# Patient Record
Sex: FEMALE | Race: WHITE | NOT HISPANIC OR LATINO | Employment: OTHER | URBAN - METROPOLITAN AREA
[De-identification: names, ages, dates, MRNs, and addresses within clinical notes are randomized per-mention and may not be internally consistent; named-entity substitution may affect disease eponyms.]

---

## 2017-03-16 ENCOUNTER — LAB REQUISITION (OUTPATIENT)
Dept: LAB | Facility: HOSPITAL | Age: 52
End: 2017-03-16

## 2017-03-16 DIAGNOSIS — J32.9 CHRONIC SINUSITIS: ICD-10-CM

## 2017-03-16 LAB — POTASSIUM BLDA-SCNC: 3.54 MMOL/L (ref 3.5–5.3)

## 2017-03-16 PROCEDURE — 87077 CULTURE AEROBIC IDENTIFY: CPT | Performed by: OTOLARYNGOLOGY

## 2017-03-16 PROCEDURE — 87070 CULTURE OTHR SPECIMN AEROBIC: CPT | Performed by: OTOLARYNGOLOGY

## 2017-03-16 PROCEDURE — 88311 DECALCIFY TISSUE: CPT | Performed by: OTOLARYNGOLOGY

## 2017-03-16 PROCEDURE — 87147 CULTURE TYPE IMMUNOLOGIC: CPT | Performed by: OTOLARYNGOLOGY

## 2017-03-16 PROCEDURE — 84132 ASSAY OF SERUM POTASSIUM: CPT | Performed by: OTOLARYNGOLOGY

## 2017-03-16 PROCEDURE — 87015 SPECIMEN INFECT AGNT CONCNTJ: CPT | Performed by: OTOLARYNGOLOGY

## 2017-03-16 PROCEDURE — 87186 SC STD MICRODIL/AGAR DIL: CPT | Performed by: OTOLARYNGOLOGY

## 2017-03-16 PROCEDURE — 87102 FUNGUS ISOLATION CULTURE: CPT | Performed by: OTOLARYNGOLOGY

## 2017-03-16 PROCEDURE — 88305 TISSUE EXAM BY PATHOLOGIST: CPT | Performed by: OTOLARYNGOLOGY

## 2017-03-16 PROCEDURE — 87206 SMEAR FLUORESCENT/ACID STAI: CPT | Performed by: OTOLARYNGOLOGY

## 2017-03-16 PROCEDURE — 87205 SMEAR GRAM STAIN: CPT | Performed by: OTOLARYNGOLOGY

## 2017-03-17 LAB
CYTO UR: NORMAL
LAB AP CASE REPORT: NORMAL
LAB AP CLINICAL INFORMATION: NORMAL
Lab: NORMAL
PATH REPORT.FINAL DX SPEC: NORMAL
PATH REPORT.GROSS SPEC: NORMAL

## 2017-03-20 LAB
GIE STN SPEC: NORMAL
GIE STN SPEC: NORMAL

## 2017-03-22 LAB
BACTERIA SPEC AEROBE CULT: ABNORMAL
GRAM STN SPEC: ABNORMAL
GRAM STN SPEC: ABNORMAL

## 2017-03-23 LAB
BACTERIA FLD CULT: ABNORMAL
GRAM STN SPEC: ABNORMAL

## 2017-04-27 ENCOUNTER — TELEPHONE (OUTPATIENT)
Dept: OBSTETRICS AND GYNECOLOGY | Facility: CLINIC | Age: 52
End: 2017-04-27

## 2017-04-27 LAB
FUNGUS WND CULT: NORMAL
FUNGUS WND CULT: NORMAL

## 2017-04-27 NOTE — TELEPHONE ENCOUNTER
----- Message from Martha Bajwa sent at 4/27/2017  1:29 PM EDT -----  Contact: patient  Pt needs a refill on Premarin 0.625 until she can schedule her annual, she is changing her insurance and wont be in effect until June. She also requested to have a Diflucan called in because she has been on an antibiotic for a sinus infection and is having itching and discharge.

## 2017-04-28 RX ORDER — FLUCONAZOLE 150 MG/1
TABLET ORAL
Qty: 2 TABLET | Refills: 0 | Status: SHIPPED | OUTPATIENT
Start: 2017-04-28 | End: 2017-04-28 | Stop reason: SDUPTHER

## 2017-04-28 RX ORDER — FLUCONAZOLE 150 MG/1
TABLET ORAL
Qty: 2 TABLET | Refills: 0 | Status: SHIPPED | OUTPATIENT
Start: 2017-04-28 | End: 2017-08-23

## 2017-04-28 RX ORDER — FLUCONAZOLE 150 MG/1
TABLET ORAL
Qty: 2 TABLET | Refills: 0 | Status: SHIPPED | OUTPATIENT
Start: 2017-04-28 | End: 2017-06-16 | Stop reason: SDUPTHER

## 2017-07-18 NOTE — TELEPHONE ENCOUNTER
----- Message from Lynn Joyce sent at 7/18/2017  1:48 PM EDT -----  Contact: PT  PT HAS ANNUAL ON 8/23, NEEDS REFILL TO LAST UNTIL APPT.    [phone]  [facility]  [facility]

## 2017-07-20 ENCOUNTER — LAB REQUISITION (OUTPATIENT)
Dept: LAB | Facility: HOSPITAL | Age: 52
End: 2017-07-20

## 2017-07-20 DIAGNOSIS — J01.90 ACUTE SINUSITIS: ICD-10-CM

## 2017-07-20 PROCEDURE — 88305 TISSUE EXAM BY PATHOLOGIST: CPT | Performed by: OTOLARYNGOLOGY

## 2017-07-20 PROCEDURE — 87070 CULTURE OTHR SPECIMN AEROBIC: CPT | Performed by: OTOLARYNGOLOGY

## 2017-07-20 PROCEDURE — 87205 SMEAR GRAM STAIN: CPT | Performed by: OTOLARYNGOLOGY

## 2017-07-20 PROCEDURE — 88311 DECALCIFY TISSUE: CPT | Performed by: OTOLARYNGOLOGY

## 2017-07-20 PROCEDURE — 87102 FUNGUS ISOLATION CULTURE: CPT | Performed by: OTOLARYNGOLOGY

## 2017-07-20 PROCEDURE — 87206 SMEAR FLUORESCENT/ACID STAI: CPT | Performed by: OTOLARYNGOLOGY

## 2017-07-20 PROCEDURE — 87077 CULTURE AEROBIC IDENTIFY: CPT | Performed by: OTOLARYNGOLOGY

## 2017-07-20 PROCEDURE — 87186 SC STD MICRODIL/AGAR DIL: CPT | Performed by: OTOLARYNGOLOGY

## 2017-07-20 PROCEDURE — 87147 CULTURE TYPE IMMUNOLOGIC: CPT | Performed by: OTOLARYNGOLOGY

## 2017-07-23 LAB
BACTERIA SPEC AEROBE CULT: ABNORMAL
GRAM STN SPEC: ABNORMAL

## 2017-07-24 LAB
BACTERIA SPEC AEROBE CULT: ABNORMAL
GRAM STN SPEC: ABNORMAL

## 2017-07-25 LAB
GIE STN SPEC: NORMAL
GIE STN SPEC: NORMAL

## 2017-08-23 ENCOUNTER — OFFICE VISIT (OUTPATIENT)
Dept: OBSTETRICS AND GYNECOLOGY | Facility: CLINIC | Age: 52
End: 2017-08-23

## 2017-08-23 ENCOUNTER — HOSPITAL ENCOUNTER (OUTPATIENT)
Dept: MAMMOGRAPHY | Facility: HOSPITAL | Age: 52
Discharge: HOME | End: 2017-08-23
Admitting: PHYSICIAN ASSISTANT

## 2017-08-23 VITALS
HEIGHT: 65 IN | DIASTOLIC BLOOD PRESSURE: 82 MMHG | SYSTOLIC BLOOD PRESSURE: 144 MMHG | BODY MASS INDEX: 28.66 KG/M2 | WEIGHT: 172 LBS

## 2017-08-23 DIAGNOSIS — Z01.411 ENCOUNTER FOR GYNECOLOGICAL EXAMINATION WITH ABNORMAL FINDING: Primary | ICD-10-CM

## 2017-08-23 DIAGNOSIS — N95.1 MENOPAUSAL AND FEMALE CLIMACTERIC STATES: ICD-10-CM

## 2017-08-23 DIAGNOSIS — Z01.411 ENCOUNTER FOR GYNECOLOGICAL EXAMINATION WITH ABNORMAL FINDING: ICD-10-CM

## 2017-08-23 DIAGNOSIS — R35.0 URINARY FREQUENCY: ICD-10-CM

## 2017-08-23 DIAGNOSIS — B37.31 VAGINAL YEAST INFECTION: ICD-10-CM

## 2017-08-23 DIAGNOSIS — Z12.72 SPECIAL SCREENING FOR MALIGNANT NEOPLASM OF VAGINA: ICD-10-CM

## 2017-08-23 LAB
GLUCOSE UR STRIP-MCNC: NEGATIVE MG/DL
KETONES UR QL: NEGATIVE
LEUKOCYTE EST, POC: ABNORMAL
NITRITE UR-MCNC: NEGATIVE MG/ML
PROT UR STRIP-MCNC: NEGATIVE MG/DL
RBC # UR STRIP: ABNORMAL /UL

## 2017-08-23 PROCEDURE — 99213 OFFICE O/P EST LOW 20 MIN: CPT | Performed by: PHYSICIAN ASSISTANT

## 2017-08-23 PROCEDURE — 81002 URINALYSIS NONAUTO W/O SCOPE: CPT | Performed by: PHYSICIAN ASSISTANT

## 2017-08-23 PROCEDURE — 99396 PREV VISIT EST AGE 40-64: CPT | Performed by: PHYSICIAN ASSISTANT

## 2017-08-23 PROCEDURE — G0202 SCR MAMMO BI INCL CAD: HCPCS

## 2017-08-23 PROCEDURE — 77063 BREAST TOMOSYNTHESIS BI: CPT

## 2017-08-23 RX ORDER — NITROFURANTOIN 25; 75 MG/1; MG/1
100 CAPSULE ORAL 2 TIMES DAILY
Qty: 14 CAPSULE | Refills: 0 | Status: SHIPPED | OUTPATIENT
Start: 2017-08-23 | End: 2017-08-30

## 2017-08-23 RX ORDER — FLUCONAZOLE 150 MG/1
TABLET ORAL
Qty: 2 TABLET | Refills: 1 | Status: SHIPPED | OUTPATIENT
Start: 2017-08-23 | End: 2020-01-28

## 2017-08-23 NOTE — PROGRESS NOTES
Subjective   Chief Complaint   Patient presents with   • Gynecologic Exam     last pap 16, MMG due, urinary symptoms, trace of blood and leukocytes on urine dip.      Stephanie Montes is a 51 y.o. year old  presenting to be seen for her annual exam.   She has had [id number] using premarin .625 mg and desires refills  She reports symptoms of urinary frequency and urgency and low back pain for 3-4 days  It has been over a year since last mammogram    Past Medical History:   Diagnosis Date   • Asthma    • H/O abnormal mammogram 2016   • Hypertension    • Normal vaginal Papanicolaou smear in patient with history of hysterectomy 2016        Current Outpatient Prescriptions:   •  albuterol (PROVENTIL) (2.5 MG/3ML) 0.083% nebulizer solution, INHALE CONTENTS OF 1 VIAL THRU NEBULIZER 4 (FOUR) TIMES A DAY AS NEEDED, Disp: 360 mL, Rfl: 0  •  estrogens, conjugated, (PREMARIN) 0.625 MG tablet, Take 1 tablet by mouth Daily., Disp: 30 tablet, Rfl: 11  •  fexofenadine (ALLEGRA) 180 MG tablet, Take 1 tablet by mouth once Daily, Disp: 30 tablet, Rfl: 3  •  fluticasone (FLONASE ALLERGY RELIEF) 50 MCG/ACT nasal spray, Instill 1 spray in both nostrils once Daily, Disp: 16 mL, Rfl: 11  •  Fluticasone Furoate-Vilanterol 200-25 MCG/INH aerosol powder , Inhale 2 puffs by mouth once Daily, Disp: 60 each, Rfl: 3  •  ibuprofen (ADVIL,MOTRIN) 800 MG tablet, Take 1 tablet by mouth 3 (Three) Times a Day., Disp: 90 tablet, Rfl: 0  •  ipratropium-albuterol (COMBIVENT RESPIMAT)  MCG/ACT inhaler,  INHALE 1 PUFF BY MOUTH FOUR TIMES A DAY, Disp: 4 g, Rfl: 3  •  montelukast (SINGULAIR) 10 MG tablet, Take 1 tablet by mouth once every day, Disp: 30 tablet, Rfl: 3  •  Pseudoephedrine-Guaifenesin -1200 MG tablet sustained-release 12 hour, Take 1 tablet by mouth 2 (Two) Times a Day., Disp: 24 each, Rfl: 0  •  sodium chloride 0.9 % solution 500 mL with bacitracin 09856 units reconstituted solution 50,000 Units,  gentamicin 40 MG/ML solution 80 mg, vancomycin 1000 MG reconstituted solution 1,000 mg, use as nasal lavage 1oz daily, Disp: , Rfl: 2  •  telmisartan-hydrochlorothiazide (MICARDIS HCT) 40-12.5 MG per tablet, Take 1 tablet by mouth once daily, Disp: 30 tablet, Rfl: 6  •  fluconazole (DIFLUCAN) 150 MG tablet, Take 1 tablet by mouth now, repeat again in 3 days, Disp: 2 tablet, Rfl: 1  •  nitrofurantoin, macrocrystal-monohydrate, (MACROBID) 100 MG capsule, Take 1 capsule by mouth 2 (Two) Times a Day for 7 days., Disp: 14 capsule, Rfl: 0   Allergies   Allergen Reactions   • Sulfa Antibiotics       Past Surgical History:   Procedure Laterality Date   • DIAGNOSTIC LAPAROSCOPY     • HYSTERECTOMY      2005   • SINUS SURGERY        Social History     Social History   • Marital status:      Spouse name: N/A   • Number of children: N/A   • Years of education: N/A     Occupational History   • Not on file.     Social History Main Topics   • Smoking status: Never Smoker   • Smokeless tobacco: Never Used   • Alcohol use No   • Drug use: No   • Sexual activity: Not on file     Other Topics Concern   • Not on file     Social History Narrative      Family History   Problem Relation Age of Onset   • Coronary artery disease Father    • Hypertension Father    • Hyperlipidemia Father    • Coronary artery disease Mother    • Hypertension Mother    • Hypertension Brother    • Melanoma Maternal Grandfather    • Breast cancer Maternal Aunt        The following portions of the patient's history were reviewed and updated as appropriate:problem list, current medications, allergies, past family history, past medical history, past social history and past surgical history.    Review of Systems   Constitutional: Negative.    HENT: Positive for sinus pressure.    Respiratory: Positive for wheezing.    Gastrointestinal: Negative.    Genitourinary: Positive for frequency and urgency.   Musculoskeletal: Positive for back pain.           Objective  "  /82  Ht 65\" (165.1 cm)  Wt 172 lb (78 kg)  Breastfeeding? No  BMI 28.62 kg/m2    Physical Exam   Constitutional: She appears well-developed and well-nourished. She is cooperative.   Pulmonary/Chest: Right breast exhibits no inverted nipple, no mass, no nipple discharge, no skin change and no tenderness. Left breast exhibits no inverted nipple, no mass, no nipple discharge, no skin change and no tenderness.   Abdominal: Soft. Normal appearance. There is no hepatosplenomegaly. There is no tenderness. There is no rigidity, no guarding and no CVA tenderness.   Genitourinary: There is no tenderness or lesion on the right labia. There is no tenderness or lesion on the left labia. Right adnexum displays no mass and no tenderness. Left adnexum displays no mass and no tenderness. Vaginal discharge found.   Genitourinary Comments: Thick clumpy white discharge c/w yeast   Neurological: She is alert.   Skin: Skin is warm, dry and intact.   Psychiatric: She has a normal mood and affect. Her behavior is normal.            Assessment /Plan      Stephanie was seen today for gynecologic exam.    Diagnoses and all orders for this visit:    Encounter for gynecological examination with abnormal finding  -     Mammo Screening Digital Tomosynthesis Bilateral With CAD; Future    Special screening for malignant neoplasm of vagina  -     Liquid-based Pap Smear, Screening; Future    Urinary frequency  -     Urine Culture  -     Cancel: POC Pregnancy, Urine  -     POC Urinalysis Dipstick    Menopausal and female climacteric states    Vaginal yeast infection    Other orders  -     nitrofurantoin, macrocrystal-monohydrate, (MACROBID) 100 MG capsule; Take 1 capsule by mouth 2 (Two) Times a Day for 7 days.  -     fluconazole (DIFLUCAN) 150 MG tablet; Take 1 tablet by mouth now, repeat again in 3 days  -     estrogens, conjugated, (PREMARIN) 0.625 MG tablet; Take 1 tablet by mouth Daily.               This note was electronically " signed.    Nella Shaffer PA-C   August 23, 2017

## 2017-08-25 LAB
BACTERIA UR CULT: NORMAL
BACTERIA UR CULT: NORMAL

## 2017-08-30 ENCOUNTER — TELEPHONE (OUTPATIENT)
Dept: OBSTETRICS AND GYNECOLOGY | Facility: CLINIC | Age: 52
End: 2017-08-30

## 2017-08-31 LAB
FUNGUS WND CULT: NORMAL
FUNGUS WND CULT: NORMAL

## 2017-09-05 DIAGNOSIS — Z12.72 SPECIAL SCREENING FOR MALIGNANT NEOPLASM OF VAGINA: ICD-10-CM

## 2018-05-04 ENCOUNTER — LAB REQUISITION (OUTPATIENT)
Dept: LAB | Facility: HOSPITAL | Age: 53
End: 2018-05-04

## 2018-05-04 ENCOUNTER — HOSPITAL ENCOUNTER (OUTPATIENT)
Dept: GENERAL RADIOLOGY | Facility: HOSPITAL | Age: 53
Discharge: HOME | End: 2018-05-04
Attending: INTERNAL MEDICINE | Admitting: INTERNAL MEDICINE

## 2018-05-04 ENCOUNTER — TRANSCRIBE ORDERS (OUTPATIENT)
Dept: ADMINISTRATIVE | Facility: HOSPITAL | Age: 53
End: 2018-05-04

## 2018-05-04 DIAGNOSIS — R05.3 PERSISTENT COUGH: Primary | ICD-10-CM

## 2018-05-04 DIAGNOSIS — J18.9 PNEUMONIA: ICD-10-CM

## 2018-05-04 PROCEDURE — 87186 SC STD MICRODIL/AGAR DIL: CPT | Performed by: INTERNAL MEDICINE

## 2018-05-04 PROCEDURE — 87070 CULTURE OTHR SPECIMN AEROBIC: CPT | Performed by: INTERNAL MEDICINE

## 2018-05-04 PROCEDURE — 87077 CULTURE AEROBIC IDENTIFY: CPT | Performed by: INTERNAL MEDICINE

## 2018-05-04 PROCEDURE — 87102 FUNGUS ISOLATION CULTURE: CPT | Performed by: INTERNAL MEDICINE

## 2018-05-04 PROCEDURE — 87205 SMEAR GRAM STAIN: CPT | Performed by: INTERNAL MEDICINE

## 2018-05-04 PROCEDURE — 87147 CULTURE TYPE IMMUNOLOGIC: CPT | Performed by: INTERNAL MEDICINE

## 2018-05-04 PROCEDURE — 71046 X-RAY EXAM CHEST 2 VIEWS: CPT

## 2018-05-08 LAB
BACTERIA SPEC RESP CULT: ABNORMAL
BACTERIA SPEC RESP CULT: ABNORMAL
GRAM STN SPEC: ABNORMAL

## 2018-05-21 ENCOUNTER — TRANSCRIBE ORDERS (OUTPATIENT)
Dept: ADMINISTRATIVE | Facility: HOSPITAL | Age: 53
End: 2018-05-21

## 2018-05-21 ENCOUNTER — LAB (OUTPATIENT)
Dept: LAB | Facility: HOSPITAL | Age: 53
End: 2018-05-21
Attending: INTERNAL MEDICINE

## 2018-05-21 DIAGNOSIS — J98.8 INFECTION, RESPIRATORY: ICD-10-CM

## 2018-05-21 DIAGNOSIS — R05.9 COUGH: Primary | ICD-10-CM

## 2018-05-21 DIAGNOSIS — R05.9 COUGH: ICD-10-CM

## 2018-05-21 DIAGNOSIS — J11.1 INFLUENZA: ICD-10-CM

## 2018-05-21 LAB
BASOPHILS # BLD AUTO: 0.05 10*3/MM3 (ref 0–0.2)
BASOPHILS NFR BLD AUTO: 0.9 % (ref 0–2.5)
CRP SERPL-MCNC: 0.6 MG/DL (ref 0–1)
DEPRECATED RDW RBC AUTO: 41.2 FL (ref 37–54)
EOSINOPHIL # BLD AUTO: 0.48 10*3/MM3 (ref 0–0.7)
EOSINOPHIL NFR BLD AUTO: 8.9 % (ref 0–7)
ERYTHROCYTE [DISTWIDTH] IN BLOOD BY AUTOMATED COUNT: 12.5 % (ref 11.5–14.5)
HCT VFR BLD AUTO: 40.6 % (ref 37–47)
HGB BLD-MCNC: 13.6 G/DL (ref 12–16)
IMM GRANULOCYTES # BLD: 0.01 10*3/MM3 (ref 0–0.06)
IMM GRANULOCYTES NFR BLD: 0.2 % (ref 0–0.6)
LYMPHOCYTES # BLD AUTO: 2.03 10*3/MM3 (ref 0.6–3.4)
LYMPHOCYTES NFR BLD AUTO: 37.7 % (ref 10–50)
MCH RBC QN AUTO: 29.9 PG (ref 27–31)
MCHC RBC AUTO-ENTMCNC: 33.5 G/DL (ref 30–37)
MCV RBC AUTO: 89.2 FL (ref 81–99)
MONOCYTES # BLD AUTO: 0.4 10*3/MM3 (ref 0–0.9)
MONOCYTES NFR BLD AUTO: 7.4 % (ref 0–12)
NEUTROPHILS # BLD AUTO: 2.41 10*3/MM3 (ref 2–6.9)
NEUTROPHILS NFR BLD AUTO: 44.9 % (ref 37–80)
NRBC BLD MANUAL-RTO: 0 /100 WBC (ref 0–0)
PLATELET # BLD AUTO: 261 10*3/MM3 (ref 130–400)
PMV BLD AUTO: 9.5 FL (ref 6–12)
RBC # BLD AUTO: 4.55 10*6/MM3 (ref 4.2–5.4)
WBC NRBC COR # BLD: 5.38 10*3/MM3 (ref 4.8–10.8)

## 2018-05-21 PROCEDURE — 86003 ALLG SPEC IGE CRUDE XTRC EA: CPT

## 2018-05-21 PROCEDURE — 86602 ANTINOMYCES ANTIBODY: CPT

## 2018-05-21 PROCEDURE — 36415 COLL VENOUS BLD VENIPUNCTURE: CPT

## 2018-05-21 PROCEDURE — 86140 C-REACTIVE PROTEIN: CPT

## 2018-05-21 PROCEDURE — 85025 COMPLETE CBC W/AUTO DIFF WBC: CPT

## 2018-05-21 PROCEDURE — 86609 BACTERIUM ANTIBODY: CPT

## 2018-05-21 PROCEDURE — 86671 FUNGUS NES ANTIBODY: CPT

## 2018-05-21 PROCEDURE — 82785 ASSAY OF IGE: CPT

## 2018-05-21 PROCEDURE — 86606 ASPERGILLUS ANTIBODY: CPT

## 2018-05-21 PROCEDURE — 86331 IMMUNODIFFUSION OUCHTERLONY: CPT

## 2018-05-23 LAB
A FUMIGATUS IGE QN: <0.1 KU/L
A NIGER IGE QN: <0.1 KU/L
TOTAL IGE SMQN RAST: 13 IU/ML (ref 0–100)

## 2018-05-25 LAB
A FUMIGATUS1 AB SER QL ID: NEGATIVE
A PULLULANS AB SER QL: NEGATIVE
LACEYELLA SACCHARI AB SER QL: NEGATIVE
MICROPOLYSPORA FAENI: NEGATIVE
PIGEON SERUM AB QL ID: NEGATIVE
T VULGARIS AB SER QL ID: NEGATIVE

## 2018-05-29 LAB
BACTERIA SPEC AEROBE CULT: NORMAL
FUNGUS SPEC CULT: NORMAL
FUNGUS SPEC CULT: NORMAL
FUNGUS SPEC FUNGUS STN: NORMAL

## 2018-08-29 ENCOUNTER — HOSPITAL ENCOUNTER (OUTPATIENT)
Dept: GENERAL RADIOLOGY | Facility: HOSPITAL | Age: 53
Discharge: HOME | End: 2018-08-29
Attending: OTOLARYNGOLOGY | Admitting: OTOLARYNGOLOGY

## 2018-08-29 ENCOUNTER — TRANSCRIBE ORDERS (OUTPATIENT)
Dept: ADMINISTRATIVE | Facility: HOSPITAL | Age: 53
End: 2018-08-29

## 2018-08-29 DIAGNOSIS — R05.9 COUGH: ICD-10-CM

## 2018-08-29 DIAGNOSIS — R05.9 COUGH: Primary | ICD-10-CM

## 2018-08-29 PROCEDURE — 71046 X-RAY EXAM CHEST 2 VIEWS: CPT

## 2018-10-05 ENCOUNTER — HOSPITAL ENCOUNTER (OUTPATIENT)
Dept: GENERAL RADIOLOGY | Facility: HOSPITAL | Age: 53
Discharge: HOME | End: 2018-10-05
Attending: INTERNAL MEDICINE | Admitting: INTERNAL MEDICINE

## 2018-10-05 ENCOUNTER — HOSPITAL ENCOUNTER (OUTPATIENT)
Dept: GENERAL RADIOLOGY | Facility: HOSPITAL | Age: 53
Discharge: HOME | End: 2018-10-05

## 2018-10-05 ENCOUNTER — TRANSCRIBE ORDERS (OUTPATIENT)
Dept: ADMINISTRATIVE | Facility: HOSPITAL | Age: 53
End: 2018-10-05

## 2018-10-05 DIAGNOSIS — M25.562 LEFT KNEE PAIN, UNSPECIFIED CHRONICITY: ICD-10-CM

## 2018-10-05 DIAGNOSIS — M25.552 LEFT HIP PAIN: Primary | ICD-10-CM

## 2018-10-05 DIAGNOSIS — M25.552 LEFT HIP PAIN: ICD-10-CM

## 2018-10-05 PROCEDURE — 73562 X-RAY EXAM OF KNEE 3: CPT

## 2018-10-05 PROCEDURE — 73502 X-RAY EXAM HIP UNI 2-3 VIEWS: CPT

## 2018-10-24 ENCOUNTER — OFFICE VISIT (OUTPATIENT)
Dept: OBSTETRICS AND GYNECOLOGY | Facility: CLINIC | Age: 53
End: 2018-10-24

## 2018-10-24 ENCOUNTER — HOSPITAL ENCOUNTER (OUTPATIENT)
Dept: MAMMOGRAPHY | Facility: HOSPITAL | Age: 53
Discharge: HOME | End: 2018-10-24
Admitting: PHYSICIAN ASSISTANT

## 2018-10-24 VITALS
WEIGHT: 176.6 LBS | HEIGHT: 65 IN | SYSTOLIC BLOOD PRESSURE: 140 MMHG | BODY MASS INDEX: 29.42 KG/M2 | DIASTOLIC BLOOD PRESSURE: 88 MMHG

## 2018-10-24 DIAGNOSIS — Z12.39 SCREENING FOR BREAST CANCER: ICD-10-CM

## 2018-10-24 DIAGNOSIS — Z79.890 HX OF LONG-TERM (CURRENT) USE OF POSTMENOPAUSAL HORMONE REPLACEMENT THERAPY: ICD-10-CM

## 2018-10-24 DIAGNOSIS — Z12.72 SPECIAL SCREENING FOR MALIGNANT NEOPLASM OF VAGINA: ICD-10-CM

## 2018-10-24 DIAGNOSIS — Z01.411 ENCOUNTER FOR GYNECOLOGICAL EXAMINATION WITH ABNORMAL FINDING: Primary | ICD-10-CM

## 2018-10-24 DIAGNOSIS — Z12.31 SCREENING MAMMOGRAM, ENCOUNTER FOR: ICD-10-CM

## 2018-10-24 DIAGNOSIS — N76.0 ACUTE VAGINITIS: ICD-10-CM

## 2018-10-24 PROCEDURE — 99396 PREV VISIT EST AGE 40-64: CPT | Performed by: PHYSICIAN ASSISTANT

## 2018-10-24 PROCEDURE — 77063 BREAST TOMOSYNTHESIS BI: CPT

## 2018-10-24 PROCEDURE — 77067 SCR MAMMO BI INCL CAD: CPT

## 2018-10-24 RX ORDER — DOXYCYCLINE 100 MG/1
CAPSULE ORAL
COMMUNITY
End: 2020-01-28

## 2018-10-24 RX ORDER — MONTELUKAST SODIUM 10 MG/1
TABLET ORAL
COMMUNITY
End: 2020-01-28

## 2018-10-24 RX ORDER — IPRATROPIUM BROMIDE AND ALBUTEROL SULFATE 2.5; .5 MG/3ML; MG/3ML
SOLUTION RESPIRATORY (INHALATION)
COMMUNITY
End: 2020-01-28

## 2018-10-24 RX ORDER — FLUCONAZOLE 150 MG/1
TABLET ORAL
Qty: 2 TABLET | Refills: 0 | Status: SHIPPED | OUTPATIENT
Start: 2018-10-24 | End: 2020-01-28

## 2018-10-24 RX ORDER — IBUPROFEN 800 MG/1
TABLET ORAL
COMMUNITY
End: 2020-01-28

## 2018-10-24 RX ORDER — FLUCONAZOLE 150 MG/1
TABLET ORAL
COMMUNITY
End: 2020-01-28

## 2018-10-24 RX ORDER — AMOXICILLIN AND CLAVULANATE POTASSIUM 875; 125 MG/1; MG/1
TABLET, FILM COATED ORAL
COMMUNITY
End: 2020-01-28

## 2018-10-24 RX ORDER — PREDNISONE 20 MG/1
TABLET ORAL
COMMUNITY
End: 2020-01-28

## 2018-10-24 RX ORDER — TELMISARTAN AND HYDROCHLORTHIAZIDE 40; 12.5 MG/1; MG/1
TABLET ORAL
COMMUNITY
End: 2020-01-28

## 2018-10-24 RX ORDER — PREDNISONE 10 MG/1
TABLET ORAL
COMMUNITY
End: 2020-01-28

## 2018-10-24 NOTE — PATIENT INSTRUCTIONS
Self breast exam monthly  Annual mammogram  Regular exercise  Will decrease premarin to 0.45 mg daily  Follow up one year for breast exam and review HRT but will not have to do pap if this pap normal

## 2018-10-24 NOTE — PROGRESS NOTES
Subjective   Chief Complaint   Patient presents with   • Gynecologic Exam     Last pap done 17-WNL; MMG is due;  Requesting refill on medication       Stephanie Montes is a 53 y.o. year old  presenting to be seen for her annual gynecological exam.   She has had hysterectomy BSO about 14 years ago. Currently using premarin .625 mg and desires to continue estrogen for now. Previously tried to go to 0.3 mg and had a lot of hot flashes but she is willing to try lower dose of premarin now  She is due mammogram and will schedule BHR  She complains of vaginal itching that started 2-3 days ago. Has been on steroids for asthma and thinks has yeast infection. No discharge    Past Medical History:   Diagnosis Date   • Asthma    • H/O abnormal mammogram 2016   • Hypertension    • Normal vaginal Papanicolaou smear in patient with history of hysterectomy 2016        Current Outpatient Prescriptions:   •  cetirizine (zyrTEC) 10 MG tablet, Take 1 tablet by mouth Daily., Disp: 30 tablet, Rfl: 6  •  fexofenadine (ALLEGRA) 180 MG tablet, Take 1 tablet by mouth once Daily, Disp: 30 tablet, Rfl: 3  •  fexofenadine (ALLEGRA) 180 MG tablet, Take 1 tablet by mouth Daily., Disp: 30 tablet, Rfl: 3  •  fexofenadine (ALLEGRA) 180 MG tablet, Take 1 tablet by mouth Daily., Disp: 30 tablet, Rfl: 3  •  HYDROcod Polst-CPM Polst ER (TUSSIONEX PENNKINETIC) 10-8 MG/5ML ER suspension, hydrocodone 10 mg-chlorpheniramine 8 mg/5 mL oral susp extend.rel 12hr, Disp: , Rfl:   •  ibuprofen (ADVIL,MOTRIN) 800 MG tablet, Take 1 tablet by mouth 3 (Three) Times a Day., Disp: 90 tablet, Rfl: 0  •  ipratropium-albuterol (COMBIVENT RESPIMAT)  MCG/ACT inhaler,  INHALE 1 PUFF BY MOUTH FOUR TIMES A DAY, Disp: 4 g, Rfl: 5  •  ipratropium-albuterol (COMBIVENT RESPIMAT)  MCG/ACT inhaler, Inhale 1 puff by mouth 4 times daily, Disp: 4 g, Rfl: 3  •  ipratropium-albuterol (COMBIVENT RESPIMAT)  MCG/ACT inhaler, Inhale 1 puff by mouth 4  times daily, Disp: 4 g, Rfl: 3  •  ipratropium-albuterol (COMBIVENT RESPIMAT)  MCG/ACT inhaler, Combivent Respimat 20 mcg-100 mcg/actuation solution for inhalation, Disp: , Rfl:   •  ipratropium-albuterol (DUO-NEB) 0.5-2.5 mg/3 ml nebulizer, Inhale contents of 1 vial by mouth through a nebulizer 3 times a day as needed, Disp: 270 mL, Rfl: 0  •  ipratropium-albuterol (DUO-NEB) 0.5-2.5 mg/3 ml nebulizer, ipratropium-albuterol 0.5 mg-3 mg(2.5 mg base)/3 mL nebulization soln, Disp: , Rfl:   •  ipratropium-albuterol (DUONEB) 0.5-2.5 mg/mL nebulizer, Inhale contents of 1 vial via nebulizer 3 times a day as needed, Disp: 90 mL, Rfl: 0  •  montelukast (SINGULAIR) 10 MG tablet, Take 1 tablet by mouth once every day, Disp: 30 tablet, Rfl: 3  •  montelukast (SINGULAIR) 10 MG tablet, Take 1 tablet by mouth daily, Disp: 30 tablet, Rfl: 0  •  montelukast (SINGULAIR) 10 MG tablet, Take 1 tablet by mouth Daily., Disp: 30 tablet, Rfl: 3  •  montelukast (SINGULAIR) 10 MG tablet, Take 1 tablet by mouth Daily., Disp: 30 tablet, Rfl: 3  •  montelukast (SINGULAIR) 10 MG tablet, Take 1 tablet by mouth once daily, Disp: 30 tablet, Rfl: 3  •  montelukast (SINGULAIR) 10 MG tablet, Take 1 tablet by mouth Daily., Disp: 30 tablet, Rfl: 3  •  montelukast (SINGULAIR) 10 MG tablet, montelukast 10 mg tablet, Disp: , Rfl:   •  pseudoephedrine-guaifenesin (MUCINEX D)  MG per 12 hr tablet, Take 1 tablet by mouth 2 (Two) Times a Day., Disp: 18 tablet, Rfl: 0  •  raNITIdine (ZANTAC) 150 MG tablet, Take 1 tablet by mouth 2 (Two) Times a Day., Disp: 60 tablet, Rfl: 0  •  telmisartan-hydrochlorothiazide (MICARDIS HCT) 40-12.5 MG per tablet, Take 1 tablet by mouth once daily, Disp: 30 tablet, Rfl: 6  •  telmisartan-hydrochlorothiazide (MICARDIS HCT) 40-12.5 MG per tablet, Take 1 tablet by mouth Daily., Disp: 30 tablet, Rfl: 6  •  telmisartan-hydrochlorothiazide (MICARDIS HCT) 40-12.5 MG per tablet, Take 1 tablet by mouth once daily, Disp: 30  tablet, Rfl: 6  •  telmisartan-hydrochlorothiazide (MICARDIS HCT) 40-12.5 MG per tablet, Micardis HCT 40 mg-12.5 mg tablet  Take 1 tablet every day by oral route., Disp: , Rfl:   •  acetaminophen-codeine (TYLENOL #3) 300-30 MG per tablet, Take 1 tablet by mouth Every 8 (Eight) Hours As Needed., Disp: 30 tablet, Rfl: 0  •  albuterol (PROVENTIL) (2.5 MG/3ML) 0.083% nebulizer solution, INHALE CONTENTS OF 1 VIAL THRU NEBULIZER 4 (FOUR) TIMES A DAY AS NEEDED, Disp: 360 mL, Rfl: 0  •  amoxicillin-clavulanate (AUGMENTIN) 875-125 MG per tablet, Take 1 tablet by mouth 2 (Two) Times a Day., Disp: 14 tablet, Rfl: 0  •  amoxicillin-clavulanate (AUGMENTIN) 875-125 MG per tablet, Take 1 tablet by mouth twice daily for 10 days, Disp: 20 tablet, Rfl: 0  •  amoxicillin-clavulanate (AUGMENTIN) 875-125 MG per tablet, amoxicillin 875 mg-potassium clavulanate 125 mg tablet, Disp: , Rfl:   •  benzonatate (TESSALON PERLES) 100 MG capsule, Take 1 capsule by mouth 3 times a day as needed for cough, Disp: 60 capsule, Rfl: 2  •  benzonatate (TESSALON) 200 MG capsule, Take 1 capsule by mouth 2 (Two) Times a Day., Disp: 20 capsule, Rfl: 0  •  cefuroxime (CEFTIN) 500 MG tablet, Take 1 tablet by mouth 2 (Two) Times a Day., Disp: 20 tablet, Rfl: 0  •  doxycycline (MONODOX) 100 MG capsule, Take 1 capsule by mouth 2 (Two) Times a Day., Disp: 10 capsule, Rfl: 0  •  doxycycline (MONODOX) 100 MG capsule, Take 1 capsule by mouth twice daily, Disp: 10 capsule, Rfl: 0  •  doxycycline (MONODOX) 100 MG capsule, doxycycline monohydrate 100 mg capsule, Disp: , Rfl:   •  estrogens, conjugated, (PREMARIN) 0.45 MG tablet, Take 1 tablet by mouth Daily., Disp: 30 tablet, Rfl: 12  •  fluconazole (DIFLUCAN) 150 MG tablet, Take 1 tablet by mouth now, repeat again in 3 days, Disp: 2 tablet, Rfl: 1  •  fluconazole (DIFLUCAN) 150 MG tablet, fluconazole 150 mg tablet, Disp: , Rfl:   •  fluconazole (DIFLUCAN) 150 MG tablet, One po now and repeat in 3 days, Disp: 2 tablet,  Rfl: 0  •  fluticasone (FLONASE ALLERGY RELIEF) 50 MCG/ACT nasal spray, Instill 1 spray in both nostrils once Daily, Disp: 16 mL, Rfl: 11  •  Fluticasone Furoate-Vilanterol 200-25 MCG/INH aerosol powder , Inhale 2 puffs by mouth once Daily, Disp: 60 each, Rfl: 3  •  Fluticasone Furoate-Vilanterol 200-25 MCG/INH aerosol powder , Inhale 1 puff by mouth twice a day, Disp: 60 each, Rfl: 0  •  Fluticasone Furoate-Vilanterol 200-25 MCG/INH aerosol powder , Inhale 1 puff by mouth once every day, Disp: 60 each, Rfl: 3  •  Fluticasone Furoate-Vilanterol 200-25 MCG/INH aerosol powder , Inhale 2 puffs by mouth once daily, Disp: 60 each, Rfl: 3  •  Fluticasone Furoate-Vilanterol 200-25 MCG/INH aerosol powder , Inhale 2 puffs by mouth once daily, Disp: 60 each, Rfl: 3  •  Fluticasone Furoate-Vilanterol 200-25 MCG/INH aerosol powder , Inhale 2 puffs by mouth once daily, Disp: 60 each, Rfl: 3  •  Fluticasone-Umeclidin-Vilant 100-62.5-25 MCG/INH aerosol powder , Inhale 1 puff by mouth once a day, Disp: 60 each, Rfl: 3  •  guaifenesin-dextromethorphan (MUCINEX DM)  MG tablet sustained-release 12 hour tablet, Take 1 tablet by mouth twice daily, Disp: 20 tablet, Rfl: 2  •  HYDROcod Polst-CPM Polst ER (TUSSIONEX PENNKINETIC) 10-8 MG/5ML ER suspension, Take 1 teaspoonful (5mL) by mouth 2 times a day as needed, Disp: 240 mL, Rfl: 0  •  HYDROcod Polst-CPM Polst ER (TUSSIONEX PENNKINETIC) 10-8 MG/5ML ER suspension, Take 1 teaspoonful by mouth 2 times a day as needed, Disp: 240 mL, Rfl: 0  •  ibuprofen (ADVIL,MOTRIN) 800 MG tablet, Take 1 tablet by mouth 3 times daily, Disp: 90 tablet, Rfl: 0  •  ibuprofen (ADVIL,MOTRIN) 800 MG tablet, Take 1 tablet by mouth 3 (Three) Times a Day., Disp: 90 tablet, Rfl: 0  •  ibuprofen (ADVIL,MOTRIN) 800 MG tablet, ibuprofen 800 mg tablet, Disp: , Rfl:   •  levocetirizine (XYZAL) 5 MG tablet, Take 1 tablet by mouth every day, Disp: 30 tablet, Rfl: 11  •  mupirocin (BACTROBAN) 2 % ointment, Apply a  small amount to the affected area twice daily for 10 days and then as needed after that. Place in normal saline and irrigate nose., Disp: 22 g, Rfl: 2  •  mupirocin (BACTROBAN) 2 % ointment, Apply a small amount to the affected area 2 times a day for 10 days and then only as needed after that., Disp: 22 g, Rfl: 2  •  predniSONE (DELTASONE) 10 MG tablet, Take 1 tablet by mouth once daily for 7 days., Disp: 7 tablet, Rfl: 0  •  predniSONE (DELTASONE) 10 MG tablet, Take 2 tablets by mouth every day for 7 days, then 1 tablet once daily for 7 days, Disp: 21 tablet, Rfl: 0  •  predniSONE (DELTASONE) 10 MG tablet, prednisone 10 mg tablet  Take 1 tablet every day by oral route for 14 days., Disp: , Rfl:   •  predniSONE (DELTASONE) 20 MG tablet, Take 1 tablet by mouth 2 times a day for 5 days, then 1 tablet daily for 3 days, then 1/2 tablet daily for 3 days, Disp: 15 tablet, Rfl: 0  •  predniSONE (DELTASONE) 20 MG tablet, Take 1 tablet by mouth twice a day for 5 days, then 1 tablet daily for 5 days, Disp: 15 tablet, Rfl: 0  •  predniSONE (DELTASONE) 20 MG tablet, prednisone 20 mg tablet, Disp: , Rfl:   •  predniSONE (DELTASONE) 5 MG tablet, Take 4 tablets by mouth twice a day X 5 days, then 4 tablet daily X 5 days, then 2 tablets daily X 5 days, then 1 tablet daily X 5 days, Disp: 75 tablet, Rfl: 0  •  Pseudoephedrine-Guaifenesin -1200 MG tablet sustained-release 12 hour, Take 1 tablet by mouth 2 (Two) Times a Day., Disp: 24 each, Rfl: 0  •  sodium chloride 0.9 % solution 500 mL with bacitracin 86373 units reconstituted solution 50,000 Units, gentamicin 40 MG/ML solution 80 mg, vancomycin 1000 MG reconstituted solution 1,000 mg, use as nasal lavage 1oz daily, Disp: , Rfl: 2   Allergies   Allergen Reactions   • Sulfa Antibiotics Hives      Past Surgical History:   Procedure Laterality Date   • DIAGNOSTIC LAPAROSCOPY     • HYSTERECTOMY      2005   • LAPAROSCOPIC ASSISTED VAGINAL HYSTERECTOMY SALPINGO OOPHORECTOMY     •  "SINUS SURGERY        Social History     Social History   • Marital status:      Spouse name: N/A   • Number of children: N/A   • Years of education: N/A     Occupational History   • Not on file.     Social History Main Topics   • Smoking status: Never Smoker   • Smokeless tobacco: Never Used   • Alcohol use No   • Drug use: No   • Sexual activity: Yes     Partners: Male     Birth control/ protection: Surgical     Other Topics Concern   • Not on file     Social History Narrative   • No narrative on file      Family History   Problem Relation Age of Onset   • Coronary artery disease Father    • Hypertension Father    • Hyperlipidemia Father    • Coronary artery disease Mother    • Hypertension Mother    • Hypertension Brother    • Melanoma Maternal Grandfather    • Breast cancer Maternal Aunt        Review of Systems   Constitutional: Negative.    Gastrointestinal: Negative.    Genitourinary: Negative.            Objective   /88   Ht 165.1 cm (65\")   Wt 80.1 kg (176 lb 9.6 oz)   Breastfeeding? No   BMI 29.39 kg/m²     Physical Exam   Constitutional: She appears well-developed and well-nourished. She is cooperative.   Pulmonary/Chest: Right breast exhibits no inverted nipple, no mass, no nipple discharge, no skin change and no tenderness. Left breast exhibits no inverted nipple, no mass, no nipple discharge, no skin change and no tenderness.   Abdominal: Soft. Normal appearance. There is no tenderness.   Genitourinary: There is no tenderness or lesion on the right labia. There is no tenderness or lesion on the left labia. Right adnexum displays no mass and no tenderness. Left adnexum displays no mass and no tenderness. There is erythema in the vagina. Vaginal discharge found.   Genitourinary Comments: Vaginal opening beefy red with thick clumpy white discharge c/w yeast  Pap done   Neurological: She is alert.   Skin: Skin is warm and dry.   Psychiatric: She has a normal mood and affect. Her behavior is " normal.            Assessment and Plan  Stephanie was seen today for gynecologic exam.    Diagnoses and all orders for this visit:    Encounter for gynecological examination with abnormal finding    Special screening for malignant neoplasm of vagina    Hx of long-term (current) use of postmenopausal hormone replacement therapy    Acute vaginitis    Screening for breast cancer    Other orders  -     estrogens, conjugated, (PREMARIN) 0.45 MG tablet; Take 1 tablet by mouth Daily.  -     fluconazole (DIFLUCAN) 150 MG tablet; One po now and repeat in 3 days      Patient Instructions   Self breast exam monthly  Annual mammogram  Regular exercise  Will decrease premarin to 0.45 mg daily  Follow up one year for breast exam and review HRT but will not have to do pap if this pap normal             This note was electronically signed.    Nella Shaffer PA-C   October 24, 2018

## 2018-11-02 DIAGNOSIS — Z12.72 SPECIAL SCREENING FOR MALIGNANT NEOPLASM OF VAGINA: ICD-10-CM

## 2018-12-26 ENCOUNTER — TELEPHONE (OUTPATIENT)
Dept: OBSTETRICS AND GYNECOLOGY | Facility: CLINIC | Age: 53
End: 2018-12-26

## 2018-12-26 NOTE — TELEPHONE ENCOUNTER
----- Message from Miranda Aguilar sent at 12/26/2018  3:52 PM EST -----  Contact: PT  THIS IS ARMIDA'S PT.  SHE HAS TRIED THE DECREASED DOSE OF PREMARIN AND SAID SHE CANNOT TOLERATE IT.  SHE WOULD LIKE TO GO BACK TO .625MG TABLET.   PHARMACY.  THANKS

## 2020-01-15 ENCOUNTER — HOSPITAL ENCOUNTER (OUTPATIENT)
Dept: ULTRASOUND IMAGING | Facility: HOSPITAL | Age: 55
Discharge: HOME | End: 2020-01-15
Admitting: INTERNAL MEDICINE

## 2020-01-15 DIAGNOSIS — R10.9 ABDOMINAL PAIN: ICD-10-CM

## 2020-01-15 PROCEDURE — 76705 ECHO EXAM OF ABDOMEN: CPT

## 2020-01-28 ENCOUNTER — OFFICE VISIT (OUTPATIENT)
Dept: SURGERY | Facility: CLINIC | Age: 55
End: 2020-01-28

## 2020-01-28 VITALS
TEMPERATURE: 97.6 F | HEIGHT: 65 IN | OXYGEN SATURATION: 98 % | HEART RATE: 88 BPM | DIASTOLIC BLOOD PRESSURE: 86 MMHG | BODY MASS INDEX: 29.42 KG/M2 | SYSTOLIC BLOOD PRESSURE: 140 MMHG | WEIGHT: 176.59 LBS

## 2020-01-28 DIAGNOSIS — K80.50 RECURRENT BILIARY COLIC: ICD-10-CM

## 2020-01-28 DIAGNOSIS — K80.20 CALCULUS OF GALLBLADDER WITHOUT CHOLECYSTITIS WITHOUT OBSTRUCTION: Primary | ICD-10-CM

## 2020-01-28 PROCEDURE — 99204 OFFICE O/P NEW MOD 45 MIN: CPT | Performed by: SURGERY

## 2020-01-28 RX ORDER — SODIUM CHLORIDE, SODIUM LACTATE, POTASSIUM CHLORIDE, CALCIUM CHLORIDE 600; 310; 30; 20 MG/100ML; MG/100ML; MG/100ML; MG/100ML
50 INJECTION, SOLUTION INTRAVENOUS CONTINUOUS
Status: CANCELLED | OUTPATIENT
Start: 2020-01-28

## 2020-01-28 RX ORDER — BENZONATATE 100 MG/1
100 CAPSULE ORAL 2 TIMES DAILY PRN
COMMUNITY
End: 2020-02-12

## 2020-01-28 RX ORDER — AMOXICILLIN AND CLAVULANATE POTASSIUM 875; 125 MG/1; MG/1
TABLET, FILM COATED ORAL
COMMUNITY
End: 2020-01-28

## 2020-01-28 RX ORDER — DOXYCYCLINE 100 MG/1
CAPSULE ORAL
COMMUNITY
End: 2020-01-28

## 2020-01-28 RX ORDER — SODIUM CHLORIDE 0.9 % (FLUSH) 0.9 %
3 SYRINGE (ML) INJECTION EVERY 12 HOURS SCHEDULED
Status: CANCELLED | OUTPATIENT
Start: 2020-01-28

## 2020-01-28 RX ORDER — SODIUM CHLORIDE 0.9 % (FLUSH) 0.9 %
10 SYRINGE (ML) INJECTION AS NEEDED
Status: CANCELLED | OUTPATIENT
Start: 2020-01-28

## 2020-01-28 RX ORDER — HEPARIN SODIUM 5000 [USP'U]/ML
5000 INJECTION, SOLUTION INTRAVENOUS; SUBCUTANEOUS ONCE
Status: CANCELLED | OUTPATIENT
Start: 2020-01-28

## 2020-01-31 ENCOUNTER — APPOINTMENT (OUTPATIENT)
Dept: PREADMISSION TESTING | Facility: HOSPITAL | Age: 55
End: 2020-01-31

## 2020-01-31 ENCOUNTER — HOSPITAL ENCOUNTER (OUTPATIENT)
Dept: GENERAL RADIOLOGY | Facility: HOSPITAL | Age: 55
Discharge: HOME | End: 2020-01-31
Admitting: SURGERY

## 2020-01-31 VITALS — WEIGHT: 172 LBS | BODY MASS INDEX: 28.66 KG/M2 | HEIGHT: 65 IN

## 2020-01-31 LAB
ANION GAP SERPL CALCULATED.3IONS-SCNC: 10.8 MMOL/L (ref 5–15)
BUN BLD-MCNC: 14 MG/DL (ref 6–20)
BUN/CREAT SERPL: 14.1 (ref 7–25)
CALCIUM SPEC-SCNC: 9.6 MG/DL (ref 8.6–10.5)
CHLORIDE SERPL-SCNC: 102 MMOL/L (ref 98–107)
CO2 SERPL-SCNC: 28.2 MMOL/L (ref 22–29)
CREAT BLD-MCNC: 0.99 MG/DL (ref 0.57–1)
DEPRECATED RDW RBC AUTO: 40.7 FL (ref 37–54)
ERYTHROCYTE [DISTWIDTH] IN BLOOD BY AUTOMATED COUNT: 12.6 % (ref 12.3–15.4)
GFR SERPL CREATININE-BSD FRML MDRD: 59 ML/MIN/1.73
GLUCOSE BLD-MCNC: 87 MG/DL (ref 65–99)
HCT VFR BLD AUTO: 37.7 % (ref 34–46.6)
HGB BLD-MCNC: 12.8 G/DL (ref 12–15.9)
MCH RBC QN AUTO: 30.1 PG (ref 26.6–33)
MCHC RBC AUTO-ENTMCNC: 34 G/DL (ref 31.5–35.7)
MCV RBC AUTO: 88.7 FL (ref 79–97)
PLATELET # BLD AUTO: 254 10*3/MM3 (ref 140–450)
PMV BLD AUTO: 9.6 FL (ref 6–12)
POTASSIUM BLD-SCNC: 3.3 MMOL/L (ref 3.5–5.2)
RBC # BLD AUTO: 4.25 10*6/MM3 (ref 3.77–5.28)
SODIUM BLD-SCNC: 141 MMOL/L (ref 136–145)
WBC NRBC COR # BLD: 5.99 10*3/MM3 (ref 3.4–10.8)

## 2020-01-31 PROCEDURE — 36415 COLL VENOUS BLD VENIPUNCTURE: CPT

## 2020-01-31 PROCEDURE — 85027 COMPLETE CBC AUTOMATED: CPT | Performed by: SURGERY

## 2020-01-31 PROCEDURE — 71045 X-RAY EXAM CHEST 1 VIEW: CPT

## 2020-01-31 PROCEDURE — 80048 BASIC METABOLIC PNL TOTAL CA: CPT | Performed by: SURGERY

## 2020-02-07 ENCOUNTER — ANESTHESIA EVENT (OUTPATIENT)
Dept: PERIOP | Facility: HOSPITAL | Age: 55
End: 2020-02-07

## 2020-02-07 ENCOUNTER — ANESTHESIA (OUTPATIENT)
Dept: PERIOP | Facility: HOSPITAL | Age: 55
End: 2020-02-07

## 2020-02-07 ENCOUNTER — HOSPITAL ENCOUNTER (OUTPATIENT)
Facility: HOSPITAL | Age: 55
Setting detail: HOSPITAL OUTPATIENT SURGERY
Discharge: HOME | End: 2020-02-07
Attending: SURGERY | Admitting: SURGERY

## 2020-02-07 VITALS
SYSTOLIC BLOOD PRESSURE: 125 MMHG | HEART RATE: 63 BPM | DIASTOLIC BLOOD PRESSURE: 61 MMHG | OXYGEN SATURATION: 95 % | RESPIRATION RATE: 16 BRPM | TEMPERATURE: 99.5 F

## 2020-02-07 DIAGNOSIS — K80.50 RECURRENT BILIARY COLIC: ICD-10-CM

## 2020-02-07 DIAGNOSIS — K80.20 CALCULUS OF GALLBLADDER WITHOUT CHOLECYSTITIS WITHOUT OBSTRUCTION: ICD-10-CM

## 2020-02-07 PROCEDURE — 25010000002 HYDROMORPHONE PER 4 MG: Performed by: NURSE ANESTHETIST, CERTIFIED REGISTERED

## 2020-02-07 PROCEDURE — 25010000002 ONDANSETRON PER 1 MG: Performed by: NURSE ANESTHETIST, CERTIFIED REGISTERED

## 2020-02-07 PROCEDURE — 25010000002 PROPOFOL 200 MG/20ML EMULSION: Performed by: NURSE ANESTHETIST, CERTIFIED REGISTERED

## 2020-02-07 PROCEDURE — 47562 LAPAROSCOPIC CHOLECYSTECTOMY: CPT | Performed by: SURGERY

## 2020-02-07 PROCEDURE — 25010000002 HEPARIN (PORCINE) PER 1000 UNITS: Performed by: SURGERY

## 2020-02-07 PROCEDURE — 25010000003 AMPICILLIN-SULBACTAM PER 1.5 G: Performed by: SURGERY

## 2020-02-07 PROCEDURE — 25010000002 DEXAMETHASONE PER 1 MG: Performed by: NURSE ANESTHETIST, CERTIFIED REGISTERED

## 2020-02-07 PROCEDURE — 25010000002 KETOROLAC TROMETHAMINE PER 15 MG: Performed by: NURSE ANESTHETIST, CERTIFIED REGISTERED

## 2020-02-07 PROCEDURE — 25010000002 HALOPERIDOL LACTATE PER 5 MG: Performed by: NURSE ANESTHETIST, CERTIFIED REGISTERED

## 2020-02-07 PROCEDURE — 25010000002 HYDROMORPHONE 1 MG/ML SOLUTION

## 2020-02-07 PROCEDURE — 25010000003 LIDOCAINE 1 % SOLUTION: Performed by: SURGERY

## 2020-02-07 RX ORDER — ONDANSETRON HYDROCHLORIDE 8 MG/1
8 TABLET, FILM COATED ORAL EVERY 6 HOURS PRN
Qty: 30 TABLET | Refills: 0 | Status: SHIPPED | OUTPATIENT
Start: 2020-02-07 | End: 2021-02-04

## 2020-02-07 RX ORDER — SODIUM CHLORIDE 0.9 % (FLUSH) 0.9 %
10 SYRINGE (ML) INJECTION AS NEEDED
Status: DISCONTINUED | OUTPATIENT
Start: 2020-02-07 | End: 2020-02-07 | Stop reason: HOSPADM

## 2020-02-07 RX ORDER — PROMETHAZINE HYDROCHLORIDE 25 MG/ML
12.5 INJECTION, SOLUTION INTRAMUSCULAR; INTRAVENOUS ONCE AS NEEDED
Status: DISCONTINUED | OUTPATIENT
Start: 2020-02-07 | End: 2020-02-07 | Stop reason: HOSPADM

## 2020-02-07 RX ORDER — BUPIVACAINE HYDROCHLORIDE 2.5 MG/ML
INJECTION, SOLUTION EPIDURAL; INFILTRATION; INTRACAUDAL AS NEEDED
Status: DISCONTINUED | OUTPATIENT
Start: 2020-02-07 | End: 2020-02-07 | Stop reason: HOSPADM

## 2020-02-07 RX ORDER — SODIUM CHLORIDE 0.9 % (FLUSH) 0.9 %
3 SYRINGE (ML) INJECTION EVERY 12 HOURS SCHEDULED
Status: DISCONTINUED | OUTPATIENT
Start: 2020-02-07 | End: 2020-02-07 | Stop reason: HOSPADM

## 2020-02-07 RX ORDER — ONDANSETRON 2 MG/ML
4 INJECTION INTRAMUSCULAR; INTRAVENOUS ONCE AS NEEDED
Status: DISCONTINUED | OUTPATIENT
Start: 2020-02-07 | End: 2020-02-07 | Stop reason: HOSPADM

## 2020-02-07 RX ORDER — KETOROLAC TROMETHAMINE 30 MG/ML
INJECTION, SOLUTION INTRAMUSCULAR; INTRAVENOUS AS NEEDED
Status: DISCONTINUED | OUTPATIENT
Start: 2020-02-07 | End: 2020-02-07 | Stop reason: SURG

## 2020-02-07 RX ORDER — HALOPERIDOL 5 MG/ML
INJECTION INTRAMUSCULAR AS NEEDED
Status: DISCONTINUED | OUTPATIENT
Start: 2020-02-07 | End: 2020-02-07 | Stop reason: SURG

## 2020-02-07 RX ORDER — KETAMINE HYDROCHLORIDE 50 MG/ML
INJECTION, SOLUTION, CONCENTRATE INTRAMUSCULAR; INTRAVENOUS AS NEEDED
Status: DISCONTINUED | OUTPATIENT
Start: 2020-02-07 | End: 2020-02-07 | Stop reason: SURG

## 2020-02-07 RX ORDER — HYDROCODONE BITARTRATE AND ACETAMINOPHEN 7.5; 325 MG/1; MG/1
1 TABLET ORAL EVERY 4 HOURS PRN
Qty: 15 TABLET | Refills: 0 | Status: SHIPPED | OUTPATIENT
Start: 2020-02-07 | End: 2020-02-12

## 2020-02-07 RX ORDER — MEPERIDINE HYDROCHLORIDE 25 MG/ML
12.5 INJECTION INTRAMUSCULAR; INTRAVENOUS; SUBCUTANEOUS
Status: DISCONTINUED | OUTPATIENT
Start: 2020-02-07 | End: 2020-02-07 | Stop reason: HOSPADM

## 2020-02-07 RX ORDER — ONDANSETRON 2 MG/ML
INJECTION INTRAMUSCULAR; INTRAVENOUS AS NEEDED
Status: DISCONTINUED | OUTPATIENT
Start: 2020-02-07 | End: 2020-02-07 | Stop reason: SURG

## 2020-02-07 RX ORDER — SODIUM CHLORIDE, SODIUM LACTATE, POTASSIUM CHLORIDE, CALCIUM CHLORIDE 600; 310; 30; 20 MG/100ML; MG/100ML; MG/100ML; MG/100ML
50 INJECTION, SOLUTION INTRAVENOUS CONTINUOUS
Status: DISCONTINUED | OUTPATIENT
Start: 2020-02-07 | End: 2020-02-07 | Stop reason: HOSPADM

## 2020-02-07 RX ORDER — HEPARIN SODIUM 5000 [USP'U]/ML
5000 INJECTION, SOLUTION INTRAVENOUS; SUBCUTANEOUS ONCE
Status: COMPLETED | OUTPATIENT
Start: 2020-02-07 | End: 2020-02-07

## 2020-02-07 RX ORDER — PROPOFOL 10 MG/ML
INJECTION, EMULSION INTRAVENOUS AS NEEDED
Status: DISCONTINUED | OUTPATIENT
Start: 2020-02-07 | End: 2020-02-07 | Stop reason: SURG

## 2020-02-07 RX ORDER — LIDOCAINE HYDROCHLORIDE 10 MG/ML
INJECTION, SOLUTION INFILTRATION; PERINEURAL AS NEEDED
Status: DISCONTINUED | OUTPATIENT
Start: 2020-02-07 | End: 2020-02-07 | Stop reason: HOSPADM

## 2020-02-07 RX ORDER — IPRATROPIUM BROMIDE AND ALBUTEROL SULFATE 2.5; .5 MG/3ML; MG/3ML
3 SOLUTION RESPIRATORY (INHALATION) ONCE AS NEEDED
Status: DISCONTINUED | OUTPATIENT
Start: 2020-02-07 | End: 2020-02-07 | Stop reason: HOSPADM

## 2020-02-07 RX ORDER — HYDROCODONE BITARTRATE AND ACETAMINOPHEN 7.5; 325 MG/1; MG/1
1 TABLET ORAL ONCE AS NEEDED
Status: DISCONTINUED | OUTPATIENT
Start: 2020-02-07 | End: 2020-02-07 | Stop reason: HOSPADM

## 2020-02-07 RX ORDER — DEXAMETHASONE SODIUM PHOSPHATE 4 MG/ML
INJECTION, SOLUTION INTRA-ARTICULAR; INTRALESIONAL; INTRAMUSCULAR; INTRAVENOUS; SOFT TISSUE AS NEEDED
Status: DISCONTINUED | OUTPATIENT
Start: 2020-02-07 | End: 2020-02-07 | Stop reason: SURG

## 2020-02-07 RX ORDER — HYDROMORPHONE HCL 110MG/55ML
PATIENT CONTROLLED ANALGESIA SYRINGE INTRAVENOUS AS NEEDED
Status: DISCONTINUED | OUTPATIENT
Start: 2020-02-07 | End: 2020-02-07 | Stop reason: SURG

## 2020-02-07 RX ORDER — ROCURONIUM BROMIDE 10 MG/ML
INJECTION, SOLUTION INTRAVENOUS AS NEEDED
Status: DISCONTINUED | OUTPATIENT
Start: 2020-02-07 | End: 2020-02-07 | Stop reason: SURG

## 2020-02-07 RX ADMIN — Medication 0.5 MG: at 17:30

## 2020-02-07 RX ADMIN — DEXAMETHASONE SODIUM PHOSPHATE 8 MG: 4 INJECTION, SOLUTION INTRAMUSCULAR; INTRAVENOUS at 16:01

## 2020-02-07 RX ADMIN — HYDROMORPHONE HYDROCHLORIDE 1 MG: 2 INJECTION, SOLUTION INTRAMUSCULAR; INTRAVENOUS; SUBCUTANEOUS at 16:30

## 2020-02-07 RX ADMIN — SODIUM CHLORIDE, POTASSIUM CHLORIDE, SODIUM LACTATE AND CALCIUM CHLORIDE 50 ML/HR: 600; 310; 30; 20 INJECTION, SOLUTION INTRAVENOUS at 12:08

## 2020-02-07 RX ADMIN — HEPARIN SODIUM 5000 UNITS: 5000 INJECTION, SOLUTION INTRAVENOUS; SUBCUTANEOUS at 15:53

## 2020-02-07 RX ADMIN — HALOPERIDOL LACTATE 0.25 MG: 5 INJECTION, SOLUTION INTRAMUSCULAR at 16:10

## 2020-02-07 RX ADMIN — PROPOFOL 200 MG: 10 INJECTION, EMULSION INTRAVENOUS at 16:01

## 2020-02-07 RX ADMIN — HYDROMORPHONE HYDROCHLORIDE 1 MG: 2 INJECTION, SOLUTION INTRAMUSCULAR; INTRAVENOUS; SUBCUTANEOUS at 16:01

## 2020-02-07 RX ADMIN — ONDANSETRON 4 MG: 2 INJECTION INTRAMUSCULAR; INTRAVENOUS at 16:07

## 2020-02-07 RX ADMIN — ROCURONIUM BROMIDE 35 MG: 10 INJECTION INTRAVENOUS at 16:01

## 2020-02-07 RX ADMIN — HYDROMORPHONE HYDROCHLORIDE 0.5 MG: 1 INJECTION, SOLUTION INTRAMUSCULAR; INTRAVENOUS; SUBCUTANEOUS at 17:30

## 2020-02-07 RX ADMIN — LIDOCAINE HYDROCHLORIDE 50 MG: 20 INJECTION, SOLUTION INTRAVENOUS at 16:01

## 2020-02-07 RX ADMIN — KETAMINE HYDROCHLORIDE 15 MG: 50 INJECTION, SOLUTION INTRAMUSCULAR; INTRAVENOUS at 16:08

## 2020-02-07 RX ADMIN — SODIUM CHLORIDE 3 G: 900 INJECTION INTRAVENOUS at 16:08

## 2020-02-07 RX ADMIN — SODIUM CHLORIDE, POTASSIUM CHLORIDE, SODIUM LACTATE AND CALCIUM CHLORIDE: 600; 310; 30; 20 INJECTION, SOLUTION INTRAVENOUS at 16:48

## 2020-02-07 RX ADMIN — KETOROLAC TROMETHAMINE 15 MG: 30 INJECTION, SOLUTION INTRAMUSCULAR at 16:42

## 2020-02-07 RX ADMIN — LIDOCAINE HYDROCHLORIDE 100 MG: 20 INJECTION, SOLUTION INTRAVENOUS at 16:32

## 2020-02-07 NOTE — ANESTHESIA PROCEDURE NOTES
Airway  Urgency: elective    Date/Time: 2/7/2020 4:04 PM  Airway not difficult    General Information and Staff    Patient location during procedure: OR  CRNA: Osito León CRNA    Indications and Patient Condition  Indications for airway management: airway protection    Preoxygenated: yes  Mask difficulty assessment: 1 - vent by mask    Final Airway Details  Final airway type: endotracheal airway      Successful airway: ETT  Cuffed: yes   Successful intubation technique: direct laryngoscopy  Facilitating devices/methods: cricoid pressure  Endotracheal tube insertion site: oral  Blade: Queen  Blade size: 2  ETT size (mm): 7.0  Cormack-Lehane Classification: grade IIa - partial view of glottis  Placement verified by: chest auscultation and capnometry   Measured from: lips  Number of attempts at approach: 1

## 2020-02-07 NOTE — ANESTHESIA PREPROCEDURE EVALUATION
Anesthesia Evaluation     Patient summary reviewed and Nursing notes reviewed   no history of anesthetic complications:  NPO Solid Status: > 8 hours  NPO Liquid Status: > 8 hours           Airway   Mallampati: I  TM distance: >3 FB  Neck ROM: full  no difficulty expected  Dental - normal exam     Pulmonary - normal exam   (+) asthma,  Cardiovascular - normal exam    (+) hypertension,       Neuro/Psych- negative ROS  GI/Hepatic/Renal/Endo - negative ROS     Musculoskeletal (-) negative ROS    Abdominal    Substance History - negative use     OB/GYN negative ob/gyn ROS         Other - negative ROS                       Anesthesia Plan    ASA 3     general     intravenous induction     Anesthetic plan, all risks, benefits, and alternatives have been provided, discussed and informed consent has been obtained with: patient.

## 2020-02-07 NOTE — ANESTHESIA POSTPROCEDURE EVALUATION
Patient: Stephanie Montes    Procedure Summary     Date:  02/07/20 Room / Location:  [facility] OR  /  [facility]    Anesthesia Start:  1558 Anesthesia Stop:  1703    Procedure:  CHOLECYSTECTOMY LAPAROSCOPIC (N/A Abdomen) Diagnosis:       Calculus of gallbladder without cholecystitis without obstruction      Recurrent biliary colic      (Calculus of gallbladder without cholecystitis without obstruction [K80.20])      (Recurrent biliary colic [K80.50])    Surgeon:  Shabnam Narayanan MD Provider:  Osito León CRNA    Anesthesia Type:  general ASA Status:  3          Anesthesia Type: general    Vitals  Vitals Value Taken Time   /70 2/7/2020  6:02 PM   Temp 99.5 °F (37.5 °C) 2/7/2020  5:47 PM   Pulse 60 2/7/2020  6:07 PM   Resp 16 2/7/2020  6:07 PM   SpO2 97 % 2/7/2020  6:07 PM           Post Anesthesia Care and Evaluation    Patient location during evaluation: PACU  Patient participation: complete - patient participated  Level of consciousness: awake and alert  Pain score: 3  Pain management: satisfactory to patient  Airway patency: patent  Anesthetic complications: No anesthetic complications  PONV Status: none  Cardiovascular status: acceptable and hemodynamically stable  Respiratory status: acceptable  Hydration status: acceptable

## 2020-02-12 ENCOUNTER — OFFICE VISIT (OUTPATIENT)
Dept: SURGERY | Facility: CLINIC | Age: 55
End: 2020-02-12

## 2020-02-12 VITALS
TEMPERATURE: 97.4 F | OXYGEN SATURATION: 98 % | HEART RATE: 89 BPM | BODY MASS INDEX: 28.99 KG/M2 | WEIGHT: 174 LBS | HEIGHT: 65 IN | SYSTOLIC BLOOD PRESSURE: 128 MMHG | DIASTOLIC BLOOD PRESSURE: 80 MMHG

## 2020-02-12 DIAGNOSIS — Z90.49 S/P LAPAROSCOPIC CHOLECYSTECTOMY: Primary | ICD-10-CM

## 2020-02-12 PROBLEM — K80.20 CALCULUS OF GALLBLADDER WITHOUT CHOLECYSTITIS WITHOUT OBSTRUCTION: Status: RESOLVED | Noted: 2020-01-28 | Resolved: 2020-02-12

## 2020-02-12 PROBLEM — K80.50 RECURRENT BILIARY COLIC: Status: RESOLVED | Noted: 2020-01-28 | Resolved: 2020-02-12

## 2020-02-12 PROCEDURE — 99024 POSTOP FOLLOW-UP VISIT: CPT | Performed by: SURGERY

## 2020-02-12 NOTE — PROGRESS NOTES
"Subjective   Stephaine Montes is a 54 y.o. female.   Chief Complaint   Patient presents with   • Post-op     lap korina       History of Present Illness     Stephanie comes the office today for routine follow-up after recently undergoing a laparoscopic cholecystectomy on 2/7/2020.  This was done for known cholelithiasis.  Final pathology demonstrated mild chronic cholecystitis, cholelithiasis, and cholesterolosis.  She reports that she is doing well overall.  She has tolerated diet without difficulties.  She reports regular bowel movements.  She denies fever, chills, nausea, vomiting, or diarrhea.  She denies problems with her incisions.    The following portions of the patient's history were reviewed and updated as appropriate: allergies, current medications, past family history, past medical history, past social history, past surgical history and problem list.    Review of Systems    Objective    /80   Pulse 89   Temp 97.4 °F (36.3 °C)   Ht 165.1 cm (65\")   Wt 78.9 kg (174 lb)   SpO2 98%   BMI 28.96 kg/m²     Physical Exam   Constitutional: She is oriented to person, place, and time. She appears well-developed and well-nourished.   HENT:   Head: Normocephalic and atraumatic.   Neck: Neck supple.   Cardiovascular: Regular rhythm.   Pulmonary/Chest: Effort normal.   Abdominal: Soft. She exhibits no distension. There is no tenderness.   Port sites healing well.     Neurological: She is alert and oriented to person, place, and time.   Skin: Skin is warm and dry.   Psychiatric: She has a normal mood and affect. Her behavior is normal.       Assessment/Plan   Stephanie was seen today for post-op.    Diagnoses and all orders for this visit:    S/P laparoscopic cholecystectomy        I had the pleasure of seeing Stephanie Montes in follow-up today for the first  postoperative visit following laparoscopic cholecystectomy for cholelithiasis with  chronic cholecystitis.  Overall, Stephanie Montes  is enjoying " uncomplicated recovery.  I do not anticipate any ongoing surgical issues and will return the patient back to the care of their PCP.  I have released Stephanie Montes to unrestricted physical activity beginning four weeks after her operative date. The patient may follow up in this office as needed.

## 2020-02-13 LAB
LAB AP CASE REPORT: NORMAL
PATH REPORT.FINAL DX SPEC: NORMAL

## 2020-03-04 NOTE — OP NOTE
PROCEDURE DATE: 2/7/2020    SURGEON: Shabnam Narayanan MD    PREOPERATIVE DIAGNOSIS: Calculus of gallbladder without cholecystitis without obstruction [K80.20] ; Recurrent biliary colic [K80.50]    POSTOPERATIVE DIAGNOSIS: same    PROCEDURE: Laparoscopic cholecystectomy    ANESTHESIA: general    EBL: 10mL    SPECIMENS: Gallbladder    INDICATIONS FOR THE PROCEDURE:  Ms. Montes is a 54-year-old female patient who recently presented for evaluation of postprandial right upper quadrant abdominal pain which was described as sharp and radiating to the back and shoulder.  Her symptoms were found to be exacerbated by eating.  She ultimately underwent an ultrasound which demonstrated gallstones.  We discussed the option of laparoscopic cholecystectomy for definitive surgical management.  We discussed the risks, benefits, and alternatives, and she provided her informed consent to proceed.  She presents today for the scheduled surgical procedure.    DESCRIPTION OF THE PROCEDURE:  The patient was seen and examined in the preoperative holding area on the day of surgery and there are no changes to the medical history.  The patient was then taken to the operating room and placed supine on the operating table where a timeout is performed using the WHO checklist.  The patient received appropriate preoperative antibiotics as well as subcutaneous heparin for DVT prophylaxis.    Following the satisfactory induction of general anesthesia the patient's abdomen was prepped and draped in the usual sterile fashion.  A vertical incision was made just above the umbilicus and was carried through the soft tissue to the level of the fascia.  The fascia was grasped and elevated between Kocher clamps and incised sharply with a knife.  Entry was confirmed into the peritoneum visually and there was no bowel visible in the vicinity of this incision.  Two stay sutures of 0 Vicryl were placed in either side of the fascia and a Hernández cannula was inserted  under direct visualization.  The sutures were used to secure the cannula.    The abdomen was insufflated with carbon dioxide to a pressure of 15 mmHg and the laparoscope was introduced.  The abdomen was inspected and no injuries were noted from initial trocar placement.  Three additional 5 mm ports were then placed in the subxiphoid, right subcostal, and right upper quadrant positions under direct visualization.  The patient was then placed into the reverse Trendelenburg position with the left side down.    Filmy adhesions between the gallbladder and omentum were freed using blunt dissection.  The dome of the gallbladder was then grasped and retracted cephalad up over the dome of the liver.  The infundibulum of the gallbladder was grasped and retracted laterally in order to splay out the triangle of Calot.  The peritoneum overlying the gallbladder infundibulum was incised with Bovie electrocautery and the cystic duct and cystic artery were identified and circumferentially skeletonized.  The cystic duct and cystic artery were completely circumferentially dissected until a critical view of safety was obtained and once this was done they were doubly clipped and divided close to the gallbladder.    The gallbladder was then dissected free from its peritoneal attachments to the liver using Bovie electrocautery.  Hemostasis was ensured using electrocautery.  Once the gallbladder was completed freed from the undersurface of liver was placed into an Endo Catch bag.  The gallbladder fossa was then reinspected and copiously irrigated with saline and hemostasis was ensured.  The cystic duct stump and cystic artery stump were reinspected and noted to be secure.  Following this the upper abdominal trocars were removed under direct visualization and no bleeding was noted.  The laparoscope was withdrawn and the abdomen was desufflated.  The Hernández cannula was removed out of the umbilical port site followed by the gallbladder which  was completely contained within the Endo Catch bag.  This was passed off the field as specimen.  The fascia of the umbilical port site was then closed using a 0 Vicryl suture placed in a figure-of-eight fashion ×2.  The skin of all incisions was closed using a 4-0 Vicryl suture placed in a subcuticular fashion.  0.25% Marcaine was injected into the wounds for postoperative analgesia.  Mastisol and steri strips were applied to the wounds and covered with dry sterile dressings.    There were no immediate complications noted and the procedure was well tolerated by the patient.  All sponge, needle,  and instrument  counts were correct at the conclusion of procedure.  Dr. Narayanan was present scrubbed for the procedure and its entirety.  The patient was awakened from anesthesia and taken to the recovery room in good condition.

## 2020-05-28 RX ORDER — IBUPROFEN 800 MG/1
800 TABLET ORAL 3 TIMES DAILY
Qty: 90 TABLET | Refills: 1 | Status: CANCELLED | OUTPATIENT
Start: 2020-05-28

## 2020-12-28 ENCOUNTER — TRANSCRIBE ORDERS (OUTPATIENT)
Dept: GENERAL RADIOLOGY | Facility: HOSPITAL | Age: 55
End: 2020-12-28

## 2020-12-28 ENCOUNTER — HOSPITAL ENCOUNTER (OUTPATIENT)
Dept: GENERAL RADIOLOGY | Facility: HOSPITAL | Age: 55
Discharge: HOME | End: 2020-12-28
Admitting: INTERNAL MEDICINE

## 2020-12-28 DIAGNOSIS — M25.579 ANKLE PAIN, UNSPECIFIED CHRONICITY, UNSPECIFIED LATERALITY: Primary | ICD-10-CM

## 2020-12-28 DIAGNOSIS — M25.579 ANKLE PAIN, UNSPECIFIED CHRONICITY, UNSPECIFIED LATERALITY: ICD-10-CM

## 2020-12-28 PROCEDURE — 73610 X-RAY EXAM OF ANKLE: CPT

## 2021-01-07 ENCOUNTER — TELEPHONE (OUTPATIENT)
Dept: OBSTETRICS AND GYNECOLOGY | Facility: CLINIC | Age: 56
End: 2021-01-07

## 2021-01-07 NOTE — TELEPHONE ENCOUNTER
----- Message from Miranda Aguilar sent at 1/7/2021 11:45 AM EST -----  Regarding: REFILL REQUEST  Contact: PT  PT IS SCHEDULED FOR ANNUAL WITH ARMIDA ON 2/4/21.  PLEASE SEND REFILLS OF PREMARIN .625MG TO  PHARMACY.  THANKS

## 2021-01-07 NOTE — TELEPHONE ENCOUNTER
PT CALLED BACK STATING RX WAS THE WRONG DOSE.  IT SHOULD BE PREMARIN .625MG.  PLEASE SEND TO  PHARMACY.  THANKS

## 2021-01-12 ENCOUNTER — TELEPHONE (OUTPATIENT)
Dept: OBSTETRICS AND GYNECOLOGY | Facility: CLINIC | Age: 56
End: 2021-01-12

## 2021-01-12 NOTE — TELEPHONE ENCOUNTER
----- Message from Miranda Aguilar sent at 2021 11:36 AM EST -----  Regarding: SAMPLE REQUEST  Contact: PT  THIS IS ARMIDA'S PT.  SHE CALLED REQUESTING SAMPLES OF PREMARIN .625MG.  SHE WENT TO  REFILL AND HER COPAY CARD HAS  AND RX IS OVER $200.  THANKS

## 2021-01-12 NOTE — TELEPHONE ENCOUNTER
I only have samples of premarin 0.3 mg and she can  samples of these and take 2 tablets daily and also  new savings card. Will need to activate savings card before goes to pharmacy. Thanks

## 2021-02-04 ENCOUNTER — OFFICE VISIT (OUTPATIENT)
Dept: OBSTETRICS AND GYNECOLOGY | Facility: CLINIC | Age: 56
End: 2021-02-04

## 2021-02-04 VITALS
DIASTOLIC BLOOD PRESSURE: 100 MMHG | BODY MASS INDEX: 28.69 KG/M2 | WEIGHT: 172.2 LBS | HEIGHT: 65 IN | SYSTOLIC BLOOD PRESSURE: 160 MMHG

## 2021-02-04 DIAGNOSIS — N76.0 ACUTE VAGINITIS: ICD-10-CM

## 2021-02-04 DIAGNOSIS — Z12.72 VAGINAL PAP SMEAR: ICD-10-CM

## 2021-02-04 DIAGNOSIS — Z12.31 ENCOUNTER FOR SCREENING MAMMOGRAM FOR MALIGNANT NEOPLASM OF BREAST: ICD-10-CM

## 2021-02-04 DIAGNOSIS — Z01.411 ENCOUNTER FOR GYNECOLOGICAL EXAMINATION WITH ABNORMAL FINDING: Primary | ICD-10-CM

## 2021-02-04 DIAGNOSIS — Z79.890 NEED FOR POSTMENOPAUSAL HORMONE REPLACEMENT: ICD-10-CM

## 2021-02-04 PROCEDURE — 99396 PREV VISIT EST AGE 40-64: CPT | Performed by: PHYSICIAN ASSISTANT

## 2021-02-04 RX ORDER — ESTRADIOL 1 MG/1
1 TABLET ORAL DAILY
Qty: 30 TABLET | Refills: 12 | Status: SHIPPED | OUTPATIENT
Start: 2021-02-04 | End: 2022-02-09 | Stop reason: SDUPTHER

## 2021-02-04 RX ORDER — FLUCONAZOLE 150 MG/1
TABLET ORAL
Qty: 2 TABLET | Refills: 0 | Status: SHIPPED | OUTPATIENT
Start: 2021-02-04 | End: 2021-02-26 | Stop reason: SDUPTHER

## 2021-02-04 NOTE — PROGRESS NOTES
Subjective   Chief Complaint   Patient presents with   • Gynecologic Exam     Last pap done 10/24/18-WNL, MMG is due   • Vaginal Itching     Patient is C/O vaginal itching       Stephanie Montes is a 55 y.o. year old  presenting to be seen for her annual gynecological exam.   She reports vaginal itching and white discharge for 3-4 days. Recently had antibiotic for urinary tract infection  Desires to continue hormone therapy but premarin very expensive with her insurance formulary change for . Would like cheaper estrogen option  Last mammogram was 2018  Previous hysterectomy and bilateral salpingo-oophorectomy    Past Medical History:   Diagnosis Date   • Asthma    • Body piercing     both ears   • Calculus of gallbladder without cholecystitis without obstruction 2020    Added automatically from request for surgery [id number]   • H/O abnormal mammogram 2016   • Hypertension     pt states with steroid therapy   • Normal vaginal Papanicolaou smear in patient with history of hysterectomy 2016   • Pneumonia    • Wears contact lenses    • Wears glasses         Current Outpatient Medications:   •  cetirizine (zyrTEC) 10 MG tablet, Take 1 tablet by mouth Daily., Disp: 30 tablet, Rfl: 6  •  ipratropium-albuterol (Combivent Respimat)  MCG/ACT inhaler, Inhale 1 puff by mouth 4 times a day, Disp: 4 g, Rfl: 1  •  pantoprazole (PROTONIX) 40 MG EC tablet, Take 1 tablet by mouth once daily, Disp: 30 tablet, Rfl: 6  •  telmisartan-hydrochlorothiazide (MICARDIS HCT) 40-12.5 MG per tablet, Take 1 tablet by mouth Daily., Disp: 30 tablet, Rfl: 6  •  estradiol (ESTRACE) 1 MG tablet, Take 1 tablet by mouth Daily., Disp: 30 tablet, Rfl: 12  •  fluconazole (Diflucan) 150 MG tablet, Take 1 tablet by mouth now and repeat in 3 days, Disp: 2 tablet, Rfl: 0  •  ibuprofen (ADVIL,MOTRIN) 800 MG tablet, Take 1 tablet by mouth 3 (Three) Times a Day. (Patient taking differently: Take 800 mg by mouth 3 (Three)  Times a Day As Needed.), Disp: 90 tablet, Rfl: 1  •  ipratropium-albuterol (COMBIVENT RESPIMAT)  MCG/ACT inhaler, Inhale 1 puff by mouth 4 times a day (Patient taking differently: Inhale 1 puff 4 (Four) Times a Day As Needed.), Disp: 4 g, Rfl: 6  •  ipratropium-albuterol (DUO-NEB) 0.5-2.5 mg/3 ml nebulizer, Inhale contents of 1 vial by mouth through a nebulizer 3 times daily as needed, Disp: 270 mL, Rfl: 3   Allergies   Allergen Reactions   • Sulfa Antibiotics Hives   • Ciprofloxacin Hives   • Clarithromycin Nausea And Vomiting      Past Surgical History:   Procedure Laterality Date   • CHOLECYSTECTOMY N/A 2/7/2020    Procedure: CHOLECYSTECTOMY LAPAROSCOPIC;  Surgeon: Shabnam Narayanan MD;  Location: [facility];  Service: General;  Laterality: N/A;   • DIAGNOSTIC LAPAROSCOPY     • DILATION AND CURETTAGE, DIAGNOSTIC / THERAPEUTIC     • LAPAROSCOPIC ASSISTED VAGINAL HYSTERECTOMY SALPINGO OOPHORECTOMY  2005   • SINUS SURGERY  2017     x 3 - Dr. Mccallum, Dr. Fernandez      Social History     Socioeconomic History   • Marital status:      Spouse name: Not on file   • Number of children: Not on file   • Years of education: Not on file   • Highest education level: Not on file   Tobacco Use   • Smoking status: Never Smoker   • Smokeless tobacco: Never Used   Substance and Sexual Activity   • Alcohol use: No   • Drug use: No   • Sexual activity: Defer     Birth control/protection: Surgical      Family History   Problem Relation Age of Onset   • Coronary artery disease Father    • Hypertension Father    • Hyperlipidemia Father    • Coronary artery disease Mother    • Hypertension Mother    • Hypertension Brother    • Melanoma Maternal Grandfather    • Breast cancer Maternal Aunt        Review of Systems   Constitutional: Negative for chills, diaphoresis and fever.   Gastrointestinal: Negative for abdominal pain, constipation, diarrhea, nausea and vomiting.   Genitourinary: Positive for vaginal discharge. Negative  "for difficulty urinating, dysuria and pelvic pain.   All other systems reviewed and are negative.          Objective   /100   Ht 165.1 cm (65\")   Wt 78.1 kg (172 lb 3.2 oz)   Breastfeeding No   BMI 28.66 kg/m²     Physical Exam  Constitutional:       Appearance: Normal appearance. She is well-developed, well-groomed and normal weight.   Eyes:      General: Lids are normal.      Extraocular Movements: Extraocular movements intact.      Conjunctiva/sclera: Conjunctivae normal.   Chest:      Breasts: Breasts are symmetrical.         Right: No inverted nipple, mass, nipple discharge, skin change or tenderness.         Left: No inverted nipple, mass, nipple discharge, skin change or tenderness.   Abdominal:      Palpations: Abdomen is soft.      Tenderness: There is no abdominal tenderness.   Genitourinary:     Labia:         Right: No rash, tenderness or lesion.         Left: No rash, tenderness or lesion.       Urethra: No prolapse, urethral pain, urethral swelling or urethral lesion.      Vagina: Vaginal discharge and erythema present.      Uterus: Absent.       Adnexa:         Right: No mass or tenderness.          Left: No mass or tenderness.        Comments: Pap done  Introitus beefy red with white exudate  Skin:     General: Skin is warm and dry.      Findings: No lesion or rash.   Neurological:      General: No focal deficit present.      Mental Status: She is alert and oriented to person, place, and time.   Psychiatric:         Attention and Perception: Attention normal.         Mood and Affect: Mood normal.         Speech: Speech normal.         Behavior: Behavior is cooperative.         Thought Content: Thought content normal.              Assessment and Plan  Diagnoses and all orders for this visit:    1. Encounter for gynecological examination with abnormal finding (Primary)    2. Vaginal Pap smear  -     Liquid-based Pap Smear, Screening; Future    3. Need for postmenopausal hormone " replacement    4. Encounter for screening mammogram for malignant neoplasm of breast  -     Mammo Screening Digital Tomosynthesis Bilateral With CAD; Future    5. Acute vaginitis    Other orders  -     estradiol (ESTRACE) 1 MG tablet; Take 1 tablet by mouth Daily.  Dispense: 30 tablet; Refill: 12  -     fluconazole (Diflucan) 150 MG tablet; Take 1 tablet by mouth now and repeat in 3 days  Dispense: 2 tablet; Refill: 0      Patient Instructions   Self breast exam monthly  Annual mammogram  Calcium 1200 mg daily and vit D 2000 mg daily  Regular excercise             This note was electronically signed.    Nella Shaffer PA-C   February 4, 2021

## 2021-02-08 LAB
A VAGINAE DNA VAG QL NAA+PROBE: NORMAL SCORE
BVAB2 DNA VAG QL NAA+PROBE: NORMAL SCORE
C ALBICANS DNA VAG QL NAA+PROBE: NEGATIVE
C GLABRATA DNA VAG QL NAA+PROBE: NEGATIVE
C TRACH DNA VAG QL NAA+PROBE: NEGATIVE
MEGA1 DNA VAG QL NAA+PROBE: NORMAL SCORE
N GONORRHOEA DNA VAG QL NAA+PROBE: NEGATIVE
T VAGINALIS DNA VAG QL NAA+PROBE: NEGATIVE

## 2021-02-18 DIAGNOSIS — Z12.72 VAGINAL PAP SMEAR: ICD-10-CM

## 2021-09-07 ENCOUNTER — TRANSCRIBE ORDERS (OUTPATIENT)
Dept: LAB | Facility: HOSPITAL | Age: 56
End: 2021-09-07

## 2021-09-07 ENCOUNTER — LAB (OUTPATIENT)
Dept: LAB | Facility: HOSPITAL | Age: 56
End: 2021-09-07

## 2021-09-07 DIAGNOSIS — Z20.822 COVID-19 RULED OUT: Primary | ICD-10-CM

## 2021-09-07 DIAGNOSIS — Z20.822 COVID-19 RULED OUT: ICD-10-CM

## 2021-09-07 PROCEDURE — U0004 COV-19 TEST NON-CDC HGH THRU: HCPCS

## 2021-09-08 ENCOUNTER — TELEPHONE (OUTPATIENT)
Dept: OTHER | Facility: HOSPITAL | Age: 56
End: 2021-09-08

## 2021-09-08 LAB — SARS-COV-2 RNA NOSE QL NAA+PROBE: DETECTED

## 2022-02-09 ENCOUNTER — TELEPHONE (OUTPATIENT)
Dept: OBSTETRICS AND GYNECOLOGY | Facility: CLINIC | Age: 57
End: 2022-02-09

## 2022-02-09 ENCOUNTER — TRANSCRIBE ORDERS (OUTPATIENT)
Dept: ADMINISTRATIVE | Facility: HOSPITAL | Age: 57
End: 2022-02-09

## 2022-02-09 DIAGNOSIS — Z12.31 VISIT FOR SCREENING MAMMOGRAM: Primary | ICD-10-CM

## 2022-02-09 RX ORDER — ESTRADIOL 1 MG/1
1 TABLET ORAL DAILY
Qty: 30 TABLET | Refills: 2 | Status: SHIPPED | OUTPATIENT
Start: 2022-02-09 | End: 2022-05-25 | Stop reason: SDUPTHER

## 2022-02-09 RX ORDER — ESTRADIOL 1 MG/1
1 TABLET ORAL DAILY
Qty: 30 TABLET | Refills: 1 | Status: SHIPPED | OUTPATIENT
Start: 2022-02-09 | End: 2022-05-25 | Stop reason: SDUPTHER

## 2022-02-09 NOTE — TELEPHONE ENCOUNTER
----- Message from Katie Ho MA sent at 2/9/2022 10:36 AM EST -----  Patient is requesting refill on Estradiol. Has Annual Holly in March.      Shana's Patient       [facility]       Thank You

## 2022-03-25 ENCOUNTER — HOSPITAL ENCOUNTER (OUTPATIENT)
Dept: MAMMOGRAPHY | Facility: HOSPITAL | Age: 57
End: 2022-03-25

## 2022-03-28 ENCOUNTER — HOSPITAL ENCOUNTER (OUTPATIENT)
Dept: MAMMOGRAPHY | Facility: HOSPITAL | Age: 57
End: 2022-03-28

## 2022-03-30 ENCOUNTER — HOSPITAL ENCOUNTER (OUTPATIENT)
Dept: MAMMOGRAPHY | Facility: HOSPITAL | Age: 57
Discharge: HOME | End: 2022-03-30
Admitting: PHYSICIAN ASSISTANT

## 2022-03-30 DIAGNOSIS — Z12.31 VISIT FOR SCREENING MAMMOGRAM: ICD-10-CM

## 2022-03-30 PROCEDURE — 77067 SCR MAMMO BI INCL CAD: CPT

## 2022-03-30 PROCEDURE — 77063 BREAST TOMOSYNTHESIS BI: CPT

## 2022-05-25 ENCOUNTER — TELEPHONE (OUTPATIENT)
Dept: OBSTETRICS AND GYNECOLOGY | Facility: CLINIC | Age: 57
End: 2022-05-25

## 2022-05-25 RX ORDER — ESTRADIOL 1 MG/1
1 TABLET ORAL DAILY
Qty: 30 TABLET | Refills: 2 | Status: SHIPPED | OUTPATIENT
Start: 2022-05-25 | End: 2022-07-25 | Stop reason: SDUPTHER

## 2022-05-25 NOTE — TELEPHONE ENCOUNTER
----- Message from Martha Bajwa sent at 5/25/2022  9:41 AM EDT -----  Patient had to reschedule her annual today due to sick grandchild. She is rescheduled for the end of July. She needs her hormones refilled until then if possible.    Shana

## 2022-07-25 RX ORDER — ESTRADIOL 1 MG/1
1 TABLET ORAL DAILY
Qty: 30 TABLET | Refills: 3 | Status: SHIPPED | OUTPATIENT
Start: 2022-07-25 | End: 2023-02-20 | Stop reason: SDUPTHER

## 2022-10-17 ENCOUNTER — TRANSCRIBE ORDERS (OUTPATIENT)
Dept: LAB | Facility: HOSPITAL | Age: 57
End: 2022-10-17

## 2022-10-17 ENCOUNTER — LAB (OUTPATIENT)
Dept: LAB | Facility: HOSPITAL | Age: 57
End: 2022-10-17

## 2022-10-17 DIAGNOSIS — U07.1 ONGOING SYMPTOMATIC DISEASE DUE TO COVID-19 VIRUS: ICD-10-CM

## 2022-10-17 DIAGNOSIS — U07.1 ONGOING SYMPTOMATIC DISEASE DUE TO COVID-19 VIRUS: Primary | ICD-10-CM

## 2022-10-17 PROCEDURE — C9803 HOPD COVID-19 SPEC COLLECT: HCPCS

## 2022-10-17 PROCEDURE — U0004 COV-19 TEST NON-CDC HGH THRU: HCPCS

## 2022-10-18 LAB — SARS-COV-2 RNA PNL SPEC NAA+PROBE: NOT DETECTED

## 2023-01-17 ENCOUNTER — TELEPHONE (OUTPATIENT)
Dept: OBSTETRICS AND GYNECOLOGY | Facility: CLINIC | Age: 58
End: 2023-01-17
Payer: COMMERCIAL

## 2023-01-17 NOTE — TELEPHONE ENCOUNTER
----- Message from Harjinder Baum sent at 1/17/2023 12:51 PM EST -----  PATIENT IS REQUESTING REFILL ON ESTRADIOL UNTIL ANNUAL APPOINTMENT.    [facility]

## 2023-01-31 ENCOUNTER — LAB (OUTPATIENT)
Dept: LAB | Facility: HOSPITAL | Age: 58
End: 2023-01-31
Payer: COMMERCIAL

## 2023-01-31 ENCOUNTER — TRANSCRIBE ORDERS (OUTPATIENT)
Dept: LAB | Facility: HOSPITAL | Age: 58
End: 2023-01-31
Payer: COMMERCIAL

## 2023-01-31 DIAGNOSIS — J30.1 ALLERGIC RHINITIS DUE TO POLLEN, UNSPECIFIED SEASONALITY: ICD-10-CM

## 2023-01-31 DIAGNOSIS — H10.45 OTHER CHRONIC ALLERGIC CONJUNCTIVITIS, UNSPECIFIED LATERALITY: ICD-10-CM

## 2023-01-31 DIAGNOSIS — T78.1XXA OTHER ADVERSE FOOD REACTIONS, NOT ELSEWHERE CLASSIFIED, INITIAL ENCOUNTER: ICD-10-CM

## 2023-01-31 DIAGNOSIS — J01.90 ACUTE SINUSITIS, RECURRENCE NOT SPECIFIED, UNSPECIFIED LOCATION: ICD-10-CM

## 2023-01-31 DIAGNOSIS — J45.51 SEVERE PERSISTENT ASTHMA WITH EXACERBATION: ICD-10-CM

## 2023-01-31 DIAGNOSIS — J30.89 OTHER ALLERGIC RHINITIS: ICD-10-CM

## 2023-01-31 DIAGNOSIS — J01.90 ACUTE SINUSITIS, RECURRENCE NOT SPECIFIED, UNSPECIFIED LOCATION: Primary | ICD-10-CM

## 2023-01-31 LAB
BASOPHILS # BLD AUTO: 0.07 10*3/MM3 (ref 0–0.2)
BASOPHILS NFR BLD AUTO: 0.7 % (ref 0–1.5)
DEPRECATED RDW RBC AUTO: 39.3 FL (ref 37–54)
EOSINOPHIL # BLD AUTO: 0.39 10*3/MM3 (ref 0–0.4)
EOSINOPHIL NFR BLD AUTO: 4 % (ref 0.3–6.2)
ERYTHROCYTE [DISTWIDTH] IN BLOOD BY AUTOMATED COUNT: 12.5 % (ref 12.3–15.4)
HCT VFR BLD AUTO: 40.9 % (ref 34–46.6)
HGB BLD-MCNC: 14 G/DL (ref 12–15.9)
IMM GRANULOCYTES # BLD AUTO: 0.02 10*3/MM3 (ref 0–0.05)
IMM GRANULOCYTES NFR BLD AUTO: 0.2 % (ref 0–0.5)
LYMPHOCYTES # BLD AUTO: 2.89 10*3/MM3 (ref 0.7–3.1)
LYMPHOCYTES NFR BLD AUTO: 29.4 % (ref 19.6–45.3)
MCH RBC QN AUTO: 29.7 PG (ref 26.6–33)
MCHC RBC AUTO-ENTMCNC: 34.2 G/DL (ref 31.5–35.7)
MCV RBC AUTO: 86.8 FL (ref 79–97)
MONOCYTES # BLD AUTO: 0.6 10*3/MM3 (ref 0.1–0.9)
MONOCYTES NFR BLD AUTO: 6.1 % (ref 5–12)
NEUTROPHILS NFR BLD AUTO: 5.85 10*3/MM3 (ref 1.7–7)
NEUTROPHILS NFR BLD AUTO: 59.6 % (ref 42.7–76)
NRBC BLD AUTO-RTO: 0 /100 WBC (ref 0–0.2)
PLATELET # BLD AUTO: 244 10*3/MM3 (ref 140–450)
PMV BLD AUTO: 9.6 FL (ref 6–12)
RBC # BLD AUTO: 4.71 10*6/MM3 (ref 3.77–5.28)
WBC NRBC COR # BLD: 9.82 10*3/MM3 (ref 3.4–10.8)

## 2023-01-31 PROCEDURE — 85025 COMPLETE CBC W/AUTO DIFF WBC: CPT

## 2023-01-31 PROCEDURE — 36415 COLL VENOUS BLD VENIPUNCTURE: CPT

## 2023-01-31 PROCEDURE — 82785 ASSAY OF IGE: CPT

## 2023-02-05 LAB — IGE SERPL-ACNC: 36 IU/ML (ref 6–495)

## 2023-02-20 ENCOUNTER — OFFICE VISIT (OUTPATIENT)
Dept: OBSTETRICS AND GYNECOLOGY | Facility: CLINIC | Age: 58
End: 2023-02-20
Payer: COMMERCIAL

## 2023-02-20 VITALS
BODY MASS INDEX: 30.09 KG/M2 | SYSTOLIC BLOOD PRESSURE: 126 MMHG | WEIGHT: 180.6 LBS | DIASTOLIC BLOOD PRESSURE: 70 MMHG | HEIGHT: 65 IN

## 2023-02-20 DIAGNOSIS — Z01.419 SMEAR, VAGINAL, AS PART OF ROUTINE GYNECOLOGICAL EXAMINATION: ICD-10-CM

## 2023-02-20 DIAGNOSIS — Z12.72 SMEAR, VAGINAL, AS PART OF ROUTINE GYNECOLOGICAL EXAMINATION: ICD-10-CM

## 2023-02-20 DIAGNOSIS — Z12.31 ENCOUNTER FOR SCREENING MAMMOGRAM FOR MALIGNANT NEOPLASM OF BREAST: ICD-10-CM

## 2023-02-20 DIAGNOSIS — Z79.890 CURRENT LONG-TERM USE OF POSTMENOPAUSAL HORMONE REPLACEMENT THERAPY: ICD-10-CM

## 2023-02-20 DIAGNOSIS — Z01.419 ROUTINE GYNECOLOGICAL EXAMINATION: Primary | ICD-10-CM

## 2023-02-20 PROCEDURE — 99396 PREV VISIT EST AGE 40-64: CPT | Performed by: PHYSICIAN ASSISTANT

## 2023-02-20 RX ORDER — ESTRADIOL 1 MG/1
1 TABLET ORAL DAILY
Qty: 90 TABLET | Refills: 3 | Status: SHIPPED | OUTPATIENT
Start: 2023-02-20

## 2023-02-20 RX ORDER — DUPILUMAB 300 MG/2ML
INJECTION, SOLUTION SUBCUTANEOUS
COMMUNITY
Start: 2023-02-16

## 2023-02-20 NOTE — PATIENT INSTRUCTIONS
Self breast exam monthly  Annual mammogram  Calcium 1200 mg daily and vit D 2000 mg daily  Regular excercise    Star Wedge Flap Text: The defect edges were debeveled with a #15 scalpel blade.  Given the location of the defect, shape of the defect and the proximity to free margins a star wedge flap was deemed most appropriate.  Using a sterile surgical marker, an appropriate rotation flap was drawn incorporating the defect and placing the expected incisions within the relaxed skin tension lines where possible. The area thus outlined was incised deep to adipose tissue with a #15 scalpel blade.  The skin margins were undermined to an appropriate distance in all directions utilizing iris scissors.

## 2023-02-20 NOTE — PROGRESS NOTES
Subjective   Chief Complaint   Patient presents with   • Gynecologic Exam     Last pap done 21-WNL, MMG is due in March, No complaints       Stephanie Montes is a 57 y.o. year old  presenting to be seen for her annual gynecological exam.   Has no complaints  Desires refills Estradiol 1 mg. Dr. Kaur refilled estradiol 2 months ago but gave her the 0.5 mg. She has had a lot of hot flashes with lower dose and wants the 1 mg estradiol  Screening mammogram due in march  Previous hysterectomy and BSO     Past Medical History:   Diagnosis Date   • Asthma    • Body piercing     both ears   • Calculus of gallbladder without cholecystitis without obstruction 2020    Added automatically from request for surgery [id number]   • H/O abnormal mammogram 2016   • Hypertension     pt states with steroid therapy   • Normal vaginal Papanicolaou smear in patient with history of hysterectomy 2016   • Pneumonia    • Wears contact lenses    • Wears glasses         Current Outpatient Medications:   •  albuterol sulfate  (90 Base) MCG/ACT inhaler, Inhale 2 puffs by mouth 4 times daily, Disp: 8.5 g, Rfl: 6  •  cetirizine (zyrTEC) 10 MG tablet, Take 1 tablet by mouth Daily., Disp: 30 tablet, Rfl: 6  •  Cholecalciferol (vitamin D3) 125 MCG (5000 UT) tablet tablet, Take 1 tablet by mouth Daily., Disp: 30 tablet, Rfl: 2  •  estradiol (ESTRACE) 1 MG tablet, Take 1 tablet by mouth Daily., Disp: 90 tablet, Rfl: 3  •  hydroCHLOROthiazide (HYDRODIURIL) 12.5 MG tablet, Take one tablet by mouth once daily, Disp: 30 tablet, Rfl: 6  •  Hydrocod Husam-Chlorphe Husam ER (Tussionex Pennkinetic ER) 10-8 MG/5ML ER suspension, Take 1 teaspoonful (5mL) by mouth 2 (Two) Times A Day, Disp: 180 mL, Rfl: 0  •  HYDROcod Polst-CPM Polst ER (TUSSIONEX PENNKINETIC) 10-8 MG/5ML ER suspension, Take 5mL by mouth twice daily as needed for cough, Disp: 240 mL, Rfl: 0  •  ibuprofen (ADVIL,MOTRIN) 800 MG tablet, take one tablet by mouth  three times a day, Disp: 90 tablet, Rfl: 1  •  pantoprazole (Protonix) 40 MG EC tablet, Take one tablet by mouth once daily, Disp: 30 tablet, Rfl: 6  •  predniSONE (DELTASONE) 10 MG tablet, Take 6 tablets by mouth on day 1, then decrease by 1 tablet each day thereafter. 6,5,4,3,2,1, Disp: 21 tablet, Rfl: 0  •  telmisartan (Micardis) 40 MG tablet, Take one tablet by mouth once daily, Disp: 30 tablet, Rfl: 1  •  telmisartan (Micardis) 40 MG tablet, take one tablet by mouth once daily, Disp: 30 tablet, Rfl: 6  •  telmisartan (Micardis) 40 MG tablet, Take 1 tablet by mouth once a day., Disp: 90 tablet, Rfl: 1  •  Dupixent 300 MG/2ML solution pen-injector, , Disp: , Rfl:    Allergies   Allergen Reactions   • Sulfa Antibiotics Hives   • Ciprofloxacin Hives   • Clarithromycin Nausea And Vomiting      Past Surgical History:   Procedure Laterality Date   • CHOLECYSTECTOMY N/A 02/07/2020    Procedure: CHOLECYSTECTOMY LAPAROSCOPIC;  Surgeon: Shabnam Narayanan MD;  Location: [facility];  Service: General;  Laterality: N/A;   • DIAGNOSTIC LAPAROSCOPY     • DILATION AND CURETTAGE, DIAGNOSTIC / THERAPEUTIC     • LAPAROSCOPIC ASSISTED VAGINAL HYSTERECTOMY SALPINGO OOPHORECTOMY  2005   • OOPHORECTOMY     • SINUS SURGERY  2017     x 3 - Dr. Mccallum, Dr. Fernandez      Social History     Socioeconomic History   • Marital status:    Tobacco Use   • Smoking status: Never   • Smokeless tobacco: Never   Vaping Use   • Vaping Use: Never used   Substance and Sexual Activity   • Alcohol use: No   • Drug use: No   • Sexual activity: Defer     Birth control/protection: Surgical, Hysterectomy      Family History   Problem Relation Age of Onset   • Coronary artery disease Father    • Hypertension Father    • Hyperlipidemia Father    • Coronary artery disease Mother    • Hypertension Mother    • Hypertension Brother    • Melanoma Maternal Grandfather    • Breast cancer Maternal Aunt        Review of Systems   Constitutional: Negative for  "chills, diaphoresis and fever.   Gastrointestinal: Negative.    Genitourinary: Negative for difficulty urinating, dysuria and pelvic pain.           Objective   /70   Ht 165.1 cm (65\")   Wt 81.9 kg (180 lb 9.6 oz)   BMI 30.05 kg/m²     Physical Exam  Exam conducted with a chaperone present.   Constitutional:       Appearance: Normal appearance. She is well-developed and well-groomed.   Eyes:      General: Lids are normal.      Extraocular Movements: Extraocular movements intact.      Conjunctiva/sclera: Conjunctivae normal.   Chest:   Breasts:     Breasts are symmetrical.      Right: No inverted nipple, mass, nipple discharge, skin change or tenderness.      Left: No inverted nipple, mass, nipple discharge, skin change or tenderness.   Abdominal:      Palpations: Abdomen is soft.      Tenderness: There is no abdominal tenderness.   Genitourinary:     Labia:         Right: No rash, tenderness or lesion.         Left: No rash, tenderness or lesion.       Urethra: No prolapse, urethral pain, urethral swelling or urethral lesion.      Vagina: No vaginal discharge, tenderness or lesions.      Uterus: Absent.       Adnexa:         Right: No mass or tenderness.          Left: No mass or tenderness.     Lymphadenopathy:      Upper Body:      Right upper body: No axillary adenopathy.      Left upper body: No axillary adenopathy.   Skin:     General: Skin is warm and dry.   Neurological:      General: No focal deficit present.      Mental Status: She is alert and oriented to person, place, and time.   Psychiatric:         Attention and Perception: Attention normal.         Mood and Affect: Mood normal.         Speech: Speech normal.         Behavior: Behavior is cooperative.            Result Review :                   Assessment and Plan  Diagnoses and all orders for this visit:    1. Routine gynecological examination (Primary)    2. Smear, vaginal, as part of routine gynecological examination  -     LIQUID-BASED " PAP SMEAR, [facility] ([facility])    3. Encounter for screening mammogram for malignant neoplasm of breast  -     Mammo Screening Digital Tomosynthesis Bilateral With CAD; Future    4. Current long-term use of postmenopausal hormone replacement therapy    Other orders  -     estradiol (ESTRACE) 1 MG tablet; Take 1 tablet by mouth Daily.  Dispense: 90 tablet; Refill: 3      Patient Instructions   Self breast exam monthly  Annual mammogram  Calcium 1200 mg daily and vit D 2000 mg daily  Regular excercise              This note was electronically signed.    Nella Shaffre PA-C   February 20, 2023

## 2023-02-23 LAB — REF LAB TEST METHOD: NORMAL

## 2023-05-24 ENCOUNTER — HOSPITAL ENCOUNTER (OUTPATIENT)
Dept: MAMMOGRAPHY | Facility: HOSPITAL | Age: 58
Discharge: HOME | End: 2023-05-24
Payer: COMMERCIAL

## 2024-02-21 RX ORDER — ESTRADIOL 1 MG/1
1 TABLET ORAL DAILY
Qty: 90 TABLET | Refills: 0 | Status: SHIPPED | OUTPATIENT
Start: 2024-02-21

## 2024-05-16 RX ORDER — ESTRADIOL 1 MG/1
1 TABLET ORAL DAILY
Qty: 90 TABLET | Refills: 0 | Status: SHIPPED | OUTPATIENT
Start: 2024-05-16

## 2024-06-30 NOTE — TELEPHONE ENCOUNTER
Problem: Discharge Planning  Goal: Discharge to home or other facility with appropriate resources  6/26/2024 2243 by Sandy Grewal LPN  Outcome: Progressing  6/26/2024 1000 by Mary Escalante RN  Outcome: Progressing     Problem: Safety - Adult  Goal: Free from fall injury  6/26/2024 2243 by Sandy Grewal LPN  Outcome: Progressing  6/26/2024 1000 by Mary Escalante RN  Outcome: Progressing     Problem: ABCDS Injury Assessment  Goal: Absence of physical injury  6/26/2024 2243 by Sandy Grewal LPN  Outcome: Progressing  6/26/2024 1000 by Mary Escalante RN  Outcome: Progressing     Problem: Pain  Goal: Verbalizes/displays adequate comfort level or baseline comfort level  6/26/2024 2243 by Sandy Grewal LPN  Outcome: Progressing  6/26/2024 1000 by Mary Escalante RN  Outcome: Progressing     Problem: Skin/Tissue Integrity  Goal: Absence of new skin breakdown  Description: 1.  Monitor for areas of redness and/or skin breakdown  2.  Assess vascular access sites hourly  3.  Every 4-6 hours minimum:  Change oxygen saturation probe site  4.  Every 4-6 hours:  If on nasal continuous positive airway pressure, respiratory therapy assess nares and determine need for appliance change or resting period.  6/26/2024 2243 by Sandy Grewal LPN  Outcome: Progressing  6/26/2024 1000 by Mary Escalante RN  Outcome: Progressing     Problem: Nutrition Deficit:  Goal: Optimize nutritional status  6/26/2024 2243 by Sandy Grewal LPN  Outcome: Progressing  6/26/2024 1000 by Mary Escalante RN  Outcome: Progressing  Flowsheets (Taken 6/26/2024 0910 by Polly Lozada, MS, RD, LD)  Nutrient intake appropriate for improving, restoring, or maintaining nutritional needs:   Monitor oral intake, labs, and treatment plans   Recommend appropriate diets, oral nutritional supplements, and vitamin/mineral supplements        Problem: Discharge Planning  Goal: Discharge to home or other facility with appropriate resources  6/27/2024 1038 by Mini Harley RN  Outcome: Progressing  6/26/2024 2243 by Sandy Grewal LPN  Outcome: Progressing     Problem: Safety - Adult  Goal: Free from fall injury  6/27/2024 1038 by Mini Harley RN  Outcome: Progressing  6/26/2024 2243 by Sandy Grewal LPN  Outcome: Progressing     Problem: ABCDS Injury Assessment  Goal: Absence of physical injury  6/27/2024 1038 by Mini Harley RN  Outcome: Progressing  6/26/2024 2243 by Sandy Grewal LPN  Outcome: Progressing     Problem: Pain  Goal: Verbalizes/displays adequate comfort level or baseline comfort level  6/27/2024 1038 by Mini Harley RN  Outcome: Progressing  6/26/2024 2243 by Sandy Grewal LPN  Outcome: Progressing     Problem: Skin/Tissue Integrity  Goal: Absence of new skin breakdown  Description: 1.  Monitor for areas of redness and/or skin breakdown  2.  Assess vascular access sites hourly  3.  Every 4-6 hours minimum:  Change oxygen saturation probe site  4.  Every 4-6 hours:  If on nasal continuous positive airway pressure, respiratory therapy assess nares and determine need for appliance change or resting period.  6/27/2024 1038 by Mini Harley RN  Outcome: Progressing  6/26/2024 2243 by Sandy Grewal LPN  Outcome: Progressing     Problem: Nutrition Deficit:  Goal: Optimize nutritional status  6/27/2024 1038 by Mini Harley RN  Outcome: Progressing  6/26/2024 2243 by Sandy Grewal LPN  Outcome: Progressing        Problem: Discharge Planning  Goal: Discharge to home or other facility with appropriate resources  6/30/2024 0904 by Cari Quarles RN  Outcome: Progressing  Flowsheets (Taken 6/30/2024 0900)  Discharge to home or other facility with appropriate resources: Refer to discharge planning if patient needs post-hospital services based on physician order or complex needs related to functional status, cognitive ability or social support system  6/29/2024 2322 by Sophia Yeh RN  Outcome: Progressing  Flowsheets (Taken 6/29/2024 1915)  Discharge to home or other facility with appropriate resources: Refer to discharge planning if patient needs post-hospital services based on physician order or complex needs related to functional status, cognitive ability or social support system     Problem: Safety - Adult  Goal: Free from fall injury  6/30/2024 0904 by Cari Quarles RN  Outcome: Progressing  6/29/2024 2322 by Sophia Yeh RN  Outcome: Progressing     Problem: ABCDS Injury Assessment  Goal: Absence of physical injury  6/30/2024 0904 by Cari Quarles RN  Outcome: Progressing  6/29/2024 2322 by Sophia Yeh RN  Outcome: Progressing     Problem: Pain  Goal: Verbalizes/displays adequate comfort level or baseline comfort level  6/30/2024 0904 by Cari Quarles RN  Outcome: Progressing  6/29/2024 2322 by Sophia Yeh RN  Outcome: Progressing     Problem: Skin/Tissue Integrity  Goal: Absence of new skin breakdown  Description: 1.  Monitor for areas of redness and/or skin breakdown  2.  Assess vascular access sites hourly  3.  Every 4-6 hours minimum:  Change oxygen saturation probe site  4.  Every 4-6 hours:  If on nasal continuous positive airway pressure, respiratory therapy assess nares and determine need for appliance change or resting period.  6/30/2024 0904 by Cari Quarles RN  Outcome: Progressing  6/29/2024 2322 by Sophia Yeh RN  Outcome: Progressing     Problem: Nutrition Deficit:  Goal: Optimize    Problem: Discharge Planning  Goal: Discharge to home or other facility with appropriate resources  Outcome: Progressing  Flowsheets  Taken 6/25/2024 1613 by Mini Harley, RN  Discharge to home or other facility with appropriate resources: Refer to discharge planning if patient needs post-hospital services based on physician order or complex needs related to functional status, cognitive ability or social support system  Taken 6/25/2024 1457 by Mini Harley, RN  Discharge to home or other facility with appropriate resources: Identify barriers to discharge with patient and caregiver     Problem: Safety - Adult  Goal: Free from fall injury  Outcome: Progressing     Problem: ABCDS Injury Assessment  Goal: Absence of physical injury  Outcome: Progressing     Problem: Pain  Goal: Verbalizes/displays adequate comfort level or baseline comfort level  Outcome: Progressing     Problem: Skin/Tissue Integrity  Goal: Absence of new skin breakdown  Description: 1.  Monitor for areas of redness and/or skin breakdown  2.  Assess vascular access sites hourly  3.  Every 4-6 hours minimum:  Change oxygen saturation probe site  4.  Every 4-6 hours:  If on nasal continuous positive airway pressure, respiratory therapy assess nares and determine need for appliance change or resting period.  Outcome: Progressing        Problem: Discharge Planning  Goal: Discharge to home or other facility with appropriate resources  Outcome: Progressing  Flowsheets (Taken 6/28/2024 1956)  Discharge to home or other facility with appropriate resources: Refer to discharge planning if patient needs post-hospital services based on physician order or complex needs related to functional status, cognitive ability or social support system     Problem: Safety - Adult  Goal: Free from fall injury  Outcome: Progressing     Problem: ABCDS Injury Assessment  Goal: Absence of physical injury  Outcome: Progressing     Problem: Pain  Goal: Verbalizes/displays adequate comfort level or baseline comfort level  Outcome: Progressing     Problem: Skin/Tissue Integrity  Goal: Absence of new skin breakdown  Description: 1.  Monitor for areas of redness and/or skin breakdown  2.  Assess vascular access sites hourly  3.  Every 4-6 hours minimum:  Change oxygen saturation probe site  4.  Every 4-6 hours:  If on nasal continuous positive airway pressure, respiratory therapy assess nares and determine need for appliance change or resting period.  Outcome: Progressing     Problem: Nutrition Deficit:  Goal: Optimize nutritional status  Outcome: Progressing  Flowsheets (Taken 6/28/2024 0846 by Polly Lozada, MS, RD, LD)  Nutrient intake appropriate for improving, restoring, or maintaining nutritional needs:   Monitor oral intake, labs, and treatment plans   Recommend appropriate diets, oral nutritional supplements, and vitamin/mineral supplements     Problem: Chronic Conditions and Co-morbidities  Goal: Patient's chronic conditions and co-morbidity symptoms are monitored and maintained or improved  Outcome: Progressing  Flowsheets (Taken 6/28/2024 1956)  Care Plan - Patient's Chronic Conditions and Co-Morbidity Symptoms are Monitored and Maintained or Improved: Monitor and assess patient's chronic conditions and comorbid symptoms for stability, deterioration, or improvement      Problem: Safety - Adult  Goal: Free from fall injury  6/27/2024 1919 by Omar Kenyon, RN  Outcome: Progressing  6/27/2024 1038 by Mini Harley, RN  Outcome: Progressing        Problem: Safety - Adult  Goal: Free from fall injury  Outcome: Progressing     Problem: Discharge Planning  Goal: Discharge to home or other facility with appropriate resources  Outcome: Progressing  Flowsheets (Taken 6/29/2024 1915)  Discharge to home or other facility with appropriate resources: Refer to discharge planning if patient needs post-hospital services based on physician order or complex needs related to functional status, cognitive ability or social support system     Problem: ABCDS Injury Assessment  Goal: Absence of physical injury  Outcome: Progressing     Problem: Pain  Goal: Verbalizes/displays adequate comfort level or baseline comfort level  Outcome: Progressing     Problem: Skin/Tissue Integrity  Goal: Absence of new skin breakdown  Description: 1.  Monitor for areas of redness and/or skin breakdown  2.  Assess vascular access sites hourly  3.  Every 4-6 hours minimum:  Change oxygen saturation probe site  4.  Every 4-6 hours:  If on nasal continuous positive airway pressure, respiratory therapy assess nares and determine need for appliance change or resting period.  Outcome: Progressing     Problem: Nutrition Deficit:  Goal: Optimize nutritional status  Outcome: Progressing     Problem: Neurosensory - Adult  Goal: Achieves stable or improved neurological status  Outcome: Progressing  Flowsheets (Taken 6/29/2024 1915)  Achieves stable or improved neurological status: Assess for and report changes in neurological status  Goal: Achieves maximal functionality and self care  Outcome: Progressing  Flowsheets (Taken 6/29/2024 1915)  Achieves maximal functionality and self care: Encourage and assist patient to increase activity and self care with guidance from physical therapy/occupational therapy     Problem: Chronic Conditions and Co-morbidities  Goal: Patient's chronic conditions and co-morbidity symptoms are monitored and maintained or improved  Outcome: Progressing     Problem: Respiratory -  May have refill premarin .625 for 2 months and  diflucan 150 mg 2 pills take one now and repeat in 3 days    involvement, this patient will be seen 15 hours per week (900 minutes within a 7 day period).    Treatments may include therapeutic exercises, gait training, neuromuscular re-ed, transfer training, community reintegration, bed mobility, w/c mobility and training, self care, home mgmt, cognitive training, energy conservation,dysphagia tx, speech/language/communication therapy, group therapy, and patient/family education. In addition, dietician/nutritionist may monitor calorie count as well as intake and collaboratively work with SLP on dietary upgrades.  Neuropsychology/Psychology may evaluate and provide necessary support.    Medical issues being managed closely and that require 24 hour availability of a physician:   [] Swallowing Precautions  [] Bowel/Bladder Fx  [] Weight bearing precautions   [] Wound Care    [x] Pain Mgmt   [x] Infection Protection   [x] DVT Prophylaxis   [] Fall Precautions  [] Fluid/Electrolyte/Nutrition Balance   [] Voice Protection   [] Respiratory  [] Other:    Medical Prognosis: [] Good  [x] Fair    [] Guarded   Total expected IRF days:  13  Anticipated discharge destination:    [] Home Independently   [x] Home with supervision    []SNF     [] Other                                           Physician anticipated functional outcomes:  Transfer surface to surface independently with or without assistive device.   IPOC brief synthesis: Acute inpatient rehabilitation with occupational therapy,  physical therapy, 180 minutes, 5 every 7   days will address basic and advancing mobility with self-care   instruction and adaptive equipment training. Caregiver education will   be offered. Expected length of stay prior to the supervised level of   functional discharge to home with home care is 13 days.    Assessment and Plan:  1.  Right below-knee amputation-pain control/PT/OT  2.  Peripheral edema-Bumex.  Change to p.o. when able  3.  Diabetes-continue insulin and monitoring.  Resume metformin in the

## 2024-08-19 ENCOUNTER — TELEPHONE (OUTPATIENT)
Dept: OBSTETRICS AND GYNECOLOGY | Facility: CLINIC | Age: 59
End: 2024-08-19
Payer: COMMERCIAL

## 2024-08-19 RX ORDER — ESTRADIOL 1 MG/1
1 TABLET ORAL DAILY
Qty: 30 TABLET | Refills: 1 | Status: SHIPPED | OUTPATIENT
Start: 2024-08-19

## 2024-08-19 RX ORDER — ESTRADIOL 1 MG/1
1 TABLET ORAL DAILY
Qty: 90 TABLET | Refills: 0 | OUTPATIENT
Start: 2024-08-19

## 2024-08-19 NOTE — TELEPHONE ENCOUNTER
----- Message from Jacqueline SMITH sent at 8/19/2024  1:09 PM EDT -----  Pt scheduled annual  is needing med refill on her estrace prior to this appt.

## 2024-10-30 ENCOUNTER — OFFICE VISIT (OUTPATIENT)
Dept: OBSTETRICS AND GYNECOLOGY | Facility: CLINIC | Age: 59
End: 2024-10-30
Payer: COMMERCIAL

## 2024-10-30 VITALS
HEIGHT: 67 IN | DIASTOLIC BLOOD PRESSURE: 68 MMHG | WEIGHT: 167 LBS | BODY MASS INDEX: 26.21 KG/M2 | SYSTOLIC BLOOD PRESSURE: 126 MMHG

## 2024-10-30 DIAGNOSIS — R39.9 URINARY TRACT INFECTION SYMPTOMS: ICD-10-CM

## 2024-10-30 DIAGNOSIS — Z12.31 ENCOUNTER FOR SCREENING MAMMOGRAM FOR BREAST CANCER: ICD-10-CM

## 2024-10-30 DIAGNOSIS — Z79.890 HORMONE REPLACEMENT THERAPY (HRT): ICD-10-CM

## 2024-10-30 DIAGNOSIS — R35.0 URINARY FREQUENCY: ICD-10-CM

## 2024-10-30 DIAGNOSIS — N95.1 MENOPAUSAL SYMPTOMS: Primary | ICD-10-CM

## 2024-10-30 DIAGNOSIS — R39.198 DIFFICULTY VOIDING: ICD-10-CM

## 2024-10-30 RX ORDER — CEPHALEXIN 500 MG/1
500 CAPSULE ORAL 4 TIMES DAILY
Qty: 28 CAPSULE | Refills: 0 | Status: SHIPPED | OUTPATIENT
Start: 2024-10-30 | End: 2024-11-06

## 2024-10-30 RX ORDER — FLUCONAZOLE 150 MG/1
TABLET ORAL
Qty: 2 TABLET | Refills: 0 | Status: SHIPPED | OUTPATIENT
Start: 2024-10-30

## 2024-10-30 RX ORDER — ESTRADIOL 1 MG/1
1 TABLET ORAL DAILY
Qty: 90 TABLET | Refills: 3 | Status: SHIPPED | OUTPATIENT
Start: 2024-10-30

## 2024-11-01 LAB
APPEARANCE UR: CLEAR
BACTERIA #/AREA URNS HPF: ABNORMAL /HPF
BACTERIA UR CULT: NO GROWTH
BACTERIA UR CULT: NORMAL
BILIRUB UR QL STRIP: NEGATIVE
CASTS URNS MICRO: ABNORMAL
COLOR UR: YELLOW
EPI CELLS #/AREA URNS HPF: ABNORMAL /HPF
GLUCOSE UR QL STRIP: NEGATIVE
HGB UR QL STRIP: ABNORMAL
KETONES UR QL STRIP: ABNORMAL
LEUKOCYTE ESTERASE UR QL STRIP: NEGATIVE
NITRITE UR QL STRIP: NEGATIVE
PH UR STRIP: 5.5 [PH] (ref 5–8)
PROT UR QL STRIP: NEGATIVE
RBC #/AREA URNS HPF: ABNORMAL /HPF
SP GR UR STRIP: 1.02 (ref 1–1.03)
UROBILINOGEN UR STRIP-MCNC: ABNORMAL MG/DL
WBC #/AREA URNS HPF: ABNORMAL /HPF

## 2024-11-01 NOTE — PROGRESS NOTES
Chief Complaint  Gynecologic Exam     History of Present Illness:  Patient is 59 y.o.  who presents to [facility] here for follow-up evaluation of her menopausal symptoms.  Patient has remained on her estradiol 1 mg.  She still has occasional hot flashes and night sweats.  She reports in the past trying 0.5 mg for 2 months with significant symptoms.  She sees Dr. King for her primary care.  She does have complaints of urinary hesitancy as well as frequency and pressure.  She has had similar symptoms in the past with a UTI.  She denies any flank pain, fever, or chills.  She did have a mammogram in .  She is due for [facility].    History  Past Medical History:   Diagnosis Date    Asthma     Body piercing     both ears    Calculus of gallbladder without cholecystitis without obstruction 2020    Added automatically from request for surgery [id number]    H/O abnormal mammogram 2016    Hypertension     pt states with steroid therapy    Normal vaginal Papanicolaou smear in patient with history of hysterectomy 2016    Pneumonia 2012    Wears contact lenses     Wears glasses      Current Outpatient Medications on File Prior to Visit   Medication Sig Dispense Refill    NON FORMULARY Gentamicin compound      cetirizine (ZyrTEC Allergy) 10 MG tablet Take 1 tablet by mouth Daily. 30 tablet 2    Dupilumab (Dupixent) 300 MG/2ML solution pen-injector Inject 600mg on Day 1, then 300mg on day 15 and every 2 weeks after 4 mL 11    EPINEPHrine (EPIPEN) 0.3 MG/0.3ML solution auto-injector injection Use as directed for acute allergic reaction. Bring in for allergy shots. 2 each 3    Fluticasone-Umeclidin-Vilant (Trelegy Ellipta) 200-62.5-25 MCG/ACT aerosol powder  Inhale 1 puff by mouth Daily. 60 each 5    hydroCHLOROthiazide 12.5 MG tablet Take 1 tablet by mouth Daily. 90 tablet 1    ibuprofen (ADVIL,MOTRIN) 800 MG tablet take one tablet by mouth three times a day 90 tablet 1     ipratropium-albuterol (Combivent Respimat)  MCG/ACT inhaler Inhale 1 puff by mouth Every 6 (Six) Hours As Needed for cough, wheeze, shortness of breath 1 g 3    ipratropium-albuterol (DUO-NEB) 0.5-2.5 mg/3 ml nebulizer Inhale contents of 1 vial through a nebulizedr every 4-6 hours as needed for cough,wheezing and shortness of breath. 270 mL 3    montelukast (SINGULAIR) 10 MG tablet Take 1 tablet by mouth every night at bedtime. 30 tablet 11    pantoprazole (Protonix) 40 MG EC tablet Take 1 tablet by mouth Daily. 30 tablet 6    telmisartan (Micardis) 40 MG tablet Take 1 tablet by mouth once a day. 90 tablet 1     No current facility-administered medications on file prior to visit.     Allergies   Allergen Reactions    Sulfa Antibiotics Hives    Ciprofloxacin Hives    Clarithromycin Nausea And Vomiting     Past Surgical History:   Procedure Laterality Date    CHOLECYSTECTOMY N/A 02/07/2020    Procedure: CHOLECYSTECTOMY LAPAROSCOPIC;  Surgeon: Shabnam Narayanan MD;  Location: [facility];  Service: General;  Laterality: N/A;    DIAGNOSTIC LAPAROSCOPY      DILATION AND CURETTAGE, DIAGNOSTIC / THERAPEUTIC      LAPAROSCOPIC ASSISTED VAGINAL HYSTERECTOMY SALPINGO OOPHORECTOMY  2005    OOPHORECTOMY      SINUS SURGERY  2017     x 3 - Dr. Mccallum, Dr. Fernandez     Family History   Problem Relation Age of Onset    Coronary artery disease Father     Hypertension Father     Hyperlipidemia Father     Coronary artery disease Mother     Hypertension Mother     Hypertension Brother     Melanoma Maternal Grandfather     Breast cancer Maternal Aunt      Social History     Socioeconomic History    Marital status:    Tobacco Use    Smoking status: Never    Smokeless tobacco: Never   Vaping Use    Vaping status: Never Used   Substance and Sexual Activity    Alcohol use: No    Drug use: No    Sexual activity: Defer     Birth control/protection: Surgical, Hysterectomy       Physical Examination:  Vital Signs: /68   Ht  "170.2 cm (67\")   Wt 75.8 kg (167 lb)   BMI 26.16 kg/m²     General Appearance: alert, appears stated age, and cooperative  Breasts: Not performed.  Abdomen: no masses, no hepatomegaly, no splenomegaly, soft non-tender, no guarding, and no rebound tenderness  Pelvic: Not performed.    Data Review:  The following data was reviewed by: Nhung Spring MD on 10/30/2024:     Labs:  IgE (01/31/2023 14:46)  CBC & Differential (01/31/2023 15:52)  Imaging:  Mammo Screening Digital Tomosynthesis Bilateral With CAD (03/30/2022 13:06)   Medical Records:  None    Assessment and Plan   1. Encounter for screening mammogram for breast cancer  It is recommended per ACOG, for women at average risk to start annual mammogram screening at the age of 40 until the age of 75 and an individualized decision be made for women after age 75.  She was encouraged to continue getting yearly mammograms.  She should report any palpable breast lump(s) or skin changes regardless of mammographic findings.  I explained to Stephanie that notification regarding her mammogram results will come from the center performing the study.  Our office will not be routinely calling with mammogram results.  It is her responsibility to make sure that the results from the mammogram are communicated to her by the breast center.  If she has any questions about the results, she is welcome to call our office anytime.  The patient reports she will schedule her mammogram.    Stephanie was counseled regarding having clinical breast exams and breast self-awareness.  Women aged 29-39 years of age should have clinical breast exams every 1-3 years and yearly aged 40 and older.  The patient was counseled regarding breast self-awareness focusing on having a sense of what is normal for her breasts so that she can tell if there are changes.  Even small changes should be reported to provider.   - Mammo Screening Digital Tomosynthesis Bilateral With CAD; Future    2. Urinary tract infection " symptoms  Send urine for clean-catch UA culture and sensitivity.  Prescription given as noted.  Patient to call for her results.  - Urine Culture - Urine, Urine, Clean Catch  - Urinalysis With Microscopic - Urine, Clean Catch  - cephalexin (Keflex) 500 MG capsule; Take 1 capsule by mouth 4 (Four) Times a Day for 7 days.  Dispense: 28 capsule; Refill: 0  - fluconazole (Diflucan) 150 MG tablet; Take 1 tablet by mouth now and repeat in 3 days  Dispense: 2 tablet; Refill: 0    3. Menopausal symptoms  The various options for the management of menopausal symptoms was discussed.  The medical treatment options discussed include HRT, SSRIs, SSNRIs, clonidine, and gabapentin.  The risks and benefits were discussed including the findings from the WHI study.  The increased risk of breast cancer, CAD, stroke, and VTE events were discussed for combination therapy vs the increased risk of CV events and breast cancer not being seen in the estrogen only group.  The lowest effective dose for the shortest duration of treatment was discussed in regards to HRT.  Other alternatives including otc supplements and lifestyle changes were also discussed.  Local estrogen therapy to relieve atrophic vaginal symptoms was discussed was well as other alternatives.   - LIQUID-BASED PAP SMEAR WITH HPV GENOTYPING IF ASCUS (GABRIELLE,COR,MAD)  - estradiol (Estrace) 1 MG tablet; Take 1 tablet by mouth Daily.  Dispense: 90 tablet; Refill: 3    4. Hormone replacement therapy (HRT)  Patient understands it is recommended to be on the lowest dose for the shortest period of time to control her symptoms.  Have discussed with the patient the risk versus benefits.  Prescriptions have been given as noted.  - estradiol (Estrace) 1 MG tablet; Take 1 tablet by mouth Daily.  Dispense: 90 tablet; Refill: 3    5. Difficulty voiding  Prescription given for Keflex as noted.  Prescription given for Diflucan prophylactically.  Patient to call for her culture results.  She is to  call if worsening and/or changes in her symptoms.  - cephalexin (Keflex) 500 MG capsule; Take 1 capsule by mouth 4 (Four) Times a Day for 7 days.  Dispense: 28 capsule; Refill: 0  - fluconazole (Diflucan) 150 MG tablet; Take 1 tablet by mouth now and repeat in 3 days  Dispense: 2 tablet; Refill: 0    6. Urinary frequency  Patient with urinary frequency as noted.  Prescription is given for Keflex as noted.  Patient to call for culture results.  - cephalexin (Keflex) 500 MG capsule; Take 1 capsule by mouth 4 (Four) Times a Day for 7 days.  Dispense: 28 capsule; Refill: 0  - fluconazole (Diflucan) 150 MG tablet; Take 1 tablet by mouth now and repeat in 3 days  Dispense: 2 tablet; Refill: 0    Follow Up/Instructions:  Follow up as noted.  Patient was given instructions and counseling regarding her condition or for health maintenance advice. Please see specific information pulled into the AVS if appropriate.     Note: Speech recognition transcription software may have been used to dictate portions of this document.  An attempt at proofreading has been made though minor errors in transcription may still be present.    This note was electronically signed.  Nhung Spring M.D.

## (undated) DEVICE — MONOPOLAR METZENBAUM SCISSOR, MINI BLADE TIP, DISPOSABLE: Brand: MONOPOLAR METZENBAUM SCISSOR, MINI BLADE TIP, DISPOSABLE

## (undated) DEVICE — CUFF SCD HEMOFORCE SEQ CALF STD MD

## (undated) DEVICE — DRSNG SURESITE WNDW 4X4.5

## (undated) DEVICE — SUT VIC 0/0 UR6 27IN DYED J603H

## (undated) DEVICE — BNDG ADHS CURAD FLX/FABRC FNGRTP LG STRL LF

## (undated) DEVICE — NDL HYPO ECLPS SFTY 22G 1 1/2IN

## (undated) DEVICE — ENDOPATH XCEL BLADELESS TROCARS WITH STABILITY SLEEVES: Brand: ENDOPATH XCEL

## (undated) DEVICE — RICH GENERAL LAPAROSCOPY: Brand: MEDLINE INDUSTRIES, INC.

## (undated) DEVICE — TP ELECTRD LAP L WR SPLIT33CM

## (undated) DEVICE — SOL NACL 0.9PCT 1000ML

## (undated) DEVICE — SYR LL TP 10ML STRL

## (undated) DEVICE — SPNG GZ STRL 2S 4X4 12PLY

## (undated) DEVICE — ENDOPATH XCEL BLUNT TIP TROCARS WITH SMOOTH SLEEVES: Brand: ENDOPATH XCEL

## (undated) DEVICE — GLV SURG ULTRATOUCH BIOGEL/COAT PF LF SZ6 STRL

## (undated) DEVICE — 2, DISPOSABLE SUCTION/IRRIGATOR WITHOUT DISPOSABLE TIP: Brand: STRYKEFLOW

## (undated) DEVICE — GLV SURG SENSICARE GREEN W/ALOE PF LF 6 STRL

## (undated) DEVICE — BNDG ADHS PLSTC 1X3IN LF

## (undated) DEVICE — ADHS LIQ MASTISOL 2/3ML

## (undated) DEVICE — DISPOSABLE MONOPOLAR ENDOSCOPIC CORD 10 FT. (3M): Brand: KIRWAN

## (undated) DEVICE — ENDOPOUCH RETRIEVER SPECIMEN RETRIEVAL BAGS: Brand: ENDOPOUCH RETRIEVER

## (undated) DEVICE — UNDYED BRAIDED (POLYGLACTIN 910), SYNTHETIC ABSORBABLE SUTURE: Brand: COATED VICRYL

## (undated) DEVICE — ENDOPATH XCEL UNIVERSAL TROCAR STABLILITY SLEEVES: Brand: ENDOPATH XCEL